# Patient Record
Sex: FEMALE | Race: WHITE | Employment: OTHER | URBAN - METROPOLITAN AREA
[De-identification: names, ages, dates, MRNs, and addresses within clinical notes are randomized per-mention and may not be internally consistent; named-entity substitution may affect disease eponyms.]

---

## 2018-01-20 PROBLEM — J96.01 ACUTE RESPIRATORY FAILURE WITH HYPOXEMIA (HCC): Status: ACTIVE | Noted: 2018-01-20

## 2018-01-20 PROBLEM — E87.6 HYPOKALEMIA: Status: ACTIVE | Noted: 2018-01-20

## 2018-01-21 PROBLEM — B33.8 RSV (RESPIRATORY SYNCYTIAL VIRUS INFECTION): Status: ACTIVE | Noted: 2018-01-21

## 2018-01-26 PROBLEM — J18.9 PNEUMONITIS: Status: ACTIVE | Noted: 2018-01-26

## 2018-01-26 PROBLEM — J98.4 PNEUMONITIS: Status: ACTIVE | Noted: 2018-01-26

## 2018-03-19 ENCOUNTER — HOSPITAL ENCOUNTER (OUTPATIENT)
Dept: MRI IMAGING | Age: 83
Discharge: HOME | End: 2018-03-21
Payer: MEDICARE

## 2018-03-19 DIAGNOSIS — G45.8 OTHER SPECIFIED TRANSIENT CEREBRAL ISCHEMIAS: ICD-10-CM

## 2018-03-19 DIAGNOSIS — I63.9 IMPENDING CEREBROVASCULAR ACCIDENT (HCC): ICD-10-CM

## 2018-03-19 PROCEDURE — 70551 MRI BRAIN STEM W/O DYE: CPT

## 2018-03-28 ENCOUNTER — HOSPITAL ENCOUNTER (OUTPATIENT)
Dept: INTERVENTIONAL RADIOLOGY/VASCULAR | Age: 83
Discharge: HOME | End: 2018-03-30
Payer: MEDICARE

## 2018-03-28 DIAGNOSIS — I69.998 VERTIGO, LATE EFFECT OF CEREBROVASCULAR DISEASE: ICD-10-CM

## 2018-03-28 DIAGNOSIS — I99.8 TRANSIENT ISCHEMIA: ICD-10-CM

## 2018-03-28 DIAGNOSIS — I65.29 STENOSIS OF CAROTID ARTERY, UNSPECIFIED LATERALITY: ICD-10-CM

## 2018-03-28 DIAGNOSIS — R42 VERTIGO, LATE EFFECT OF CEREBROVASCULAR DISEASE: ICD-10-CM

## 2018-03-28 PROCEDURE — 93880 EXTRACRANIAL BILAT STUDY: CPT

## 2018-05-27 ENCOUNTER — HOSPITAL ENCOUNTER (EMERGENCY)
Age: 83
Discharge: HOME | End: 2018-05-27
Payer: MEDICARE

## 2018-05-27 VITALS
TEMPERATURE: 99.1 F | RESPIRATION RATE: 16 BRPM | BODY MASS INDEX: 37.44 KG/M2 | HEART RATE: 69 BPM | WEIGHT: 208 LBS | OXYGEN SATURATION: 96 % | SYSTOLIC BLOOD PRESSURE: 170 MMHG | DIASTOLIC BLOOD PRESSURE: 79 MMHG

## 2018-05-27 DIAGNOSIS — K04.7 DENTAL ABSCESS: Primary | ICD-10-CM

## 2018-05-27 PROCEDURE — 99212 OFFICE O/P EST SF 10 MIN: CPT

## 2018-05-27 RX ORDER — AMOXICILLIN 500 MG/1
500 CAPSULE ORAL 3 TIMES DAILY
Qty: 30 CAPSULE | Refills: 0 | Status: SHIPPED | OUTPATIENT
Start: 2018-05-27 | End: 2018-06-06

## 2018-05-27 ASSESSMENT — PAIN DESCRIPTION - PAIN TYPE: TYPE: ACUTE PAIN

## 2018-05-27 ASSESSMENT — PAIN DESCRIPTION - FREQUENCY: FREQUENCY: CONTINUOUS

## 2018-05-27 ASSESSMENT — PAIN SCALES - GENERAL: PAINLEVEL_OUTOF10: 5

## 2018-05-27 ASSESSMENT — PAIN DESCRIPTION - DESCRIPTORS: DESCRIPTORS: ACHING

## 2018-05-27 ASSESSMENT — PAIN DESCRIPTION - ORIENTATION: ORIENTATION: RIGHT

## 2018-05-27 ASSESSMENT — PAIN DESCRIPTION - LOCATION: LOCATION: FACE

## 2018-07-23 ENCOUNTER — HOSPITAL ENCOUNTER (OUTPATIENT)
Dept: GENERAL RADIOLOGY | Age: 83
Discharge: HOME | End: 2018-07-25
Payer: MEDICARE

## 2018-07-23 ENCOUNTER — HOSPITAL ENCOUNTER (OUTPATIENT)
Age: 83
Discharge: HOME | End: 2018-07-25
Payer: MEDICARE

## 2018-07-23 DIAGNOSIS — G57.90 NEUROPATHY OF LOWER EXTREMITY, UNSPECIFIED LATERALITY: ICD-10-CM

## 2018-07-23 PROCEDURE — 73630 X-RAY EXAM OF FOOT: CPT

## 2018-08-23 ENCOUNTER — HOSPITAL ENCOUNTER (OUTPATIENT)
Age: 83
Discharge: HOME | End: 2018-08-23
Payer: MEDICARE

## 2018-08-23 LAB
ANION GAP SERPL CALCULATED.3IONS-SCNC: 12 MMOL/L (ref 7–16)
BUN BLDV-MCNC: 20 MG/DL (ref 8–23)
CALCIUM SERPL-MCNC: 9.2 MG/DL (ref 8.6–10.2)
CHLORIDE BLD-SCNC: 103 MMOL/L (ref 98–107)
CO2: 27 MMOL/L (ref 22–29)
CORTISOL TOTAL: 6.54 MCG/DL (ref 2.68–18.4)
CREAT SERPL-MCNC: 0.7 MG/DL (ref 0.5–1)
GFR AFRICAN AMERICAN: >60
GFR NON-AFRICAN AMERICAN: >60 ML/MIN/1.73
GLUCOSE BLD-MCNC: 98 MG/DL (ref 74–109)
POTASSIUM SERPL-SCNC: 4.1 MMOL/L (ref 3.5–5)
SODIUM BLD-SCNC: 142 MMOL/L (ref 132–146)

## 2018-08-23 PROCEDURE — 36415 COLL VENOUS BLD VENIPUNCTURE: CPT

## 2018-08-23 PROCEDURE — 82533 TOTAL CORTISOL: CPT

## 2018-08-23 PROCEDURE — 82088 ASSAY OF ALDOSTERONE: CPT

## 2018-08-23 PROCEDURE — 80048 BASIC METABOLIC PNL TOTAL CA: CPT

## 2018-08-23 PROCEDURE — 84244 ASSAY OF RENIN: CPT

## 2018-08-26 LAB
ALDOSTERONE: 19.6 NG/DL
RENIN ACTIVITY: 0.4 NG/ML/HR

## 2018-11-06 ENCOUNTER — APPOINTMENT (OUTPATIENT)
Dept: CT IMAGING | Age: 83
End: 2018-11-06
Payer: MEDICARE

## 2018-11-06 ENCOUNTER — APPOINTMENT (OUTPATIENT)
Dept: GENERAL RADIOLOGY | Age: 83
End: 2018-11-06
Payer: MEDICARE

## 2018-11-06 ENCOUNTER — HOSPITAL ENCOUNTER (EMERGENCY)
Age: 83
Discharge: HOME | End: 2018-11-06
Payer: MEDICARE

## 2018-11-06 VITALS
DIASTOLIC BLOOD PRESSURE: 86 MMHG | TEMPERATURE: 97.9 F | SYSTOLIC BLOOD PRESSURE: 188 MMHG | RESPIRATION RATE: 20 BRPM | BODY MASS INDEX: 37.8 KG/M2 | OXYGEN SATURATION: 95 % | HEART RATE: 63 BPM | WEIGHT: 210 LBS

## 2018-11-06 DIAGNOSIS — M54.50 ACUTE LOW BACK PAIN WITHOUT SCIATICA, UNSPECIFIED BACK PAIN LATERALITY: ICD-10-CM

## 2018-11-06 DIAGNOSIS — R10.84 GENERALIZED ABDOMINAL PAIN: Primary | ICD-10-CM

## 2018-11-06 LAB
ANION GAP SERPL CALCULATED.3IONS-SCNC: 10 MMOL/L (ref 7–16)
BASOPHILS ABSOLUTE: 0.04 E9/L (ref 0–0.2)
BASOPHILS RELATIVE PERCENT: 0.6 % (ref 0–2)
BILIRUBIN URINE: NEGATIVE
BLOOD, URINE: NEGATIVE
BUN BLDV-MCNC: 20 MG/DL (ref 8–23)
CALCIUM SERPL-MCNC: 9.9 MG/DL (ref 8.6–10.2)
CHLORIDE BLD-SCNC: 101 MMOL/L (ref 98–107)
CLARITY: CLEAR
CO2: 28 MMOL/L (ref 22–29)
COLOR: YELLOW
CREAT SERPL-MCNC: 0.8 MG/DL (ref 0.5–1)
EOSINOPHILS ABSOLUTE: 0.08 E9/L (ref 0.05–0.5)
EOSINOPHILS RELATIVE PERCENT: 1.1 % (ref 0–6)
GFR AFRICAN AMERICAN: >60
GFR NON-AFRICAN AMERICAN: >60 ML/MIN/1.73
GLUCOSE BLD-MCNC: 98 MG/DL (ref 74–99)
GLUCOSE URINE: NEGATIVE MG/DL
HCT VFR BLD CALC: 41.9 % (ref 34–48)
HEMOGLOBIN: 13.8 G/DL (ref 11.5–15.5)
IMMATURE GRANULOCYTES #: 0.05 E9/L
IMMATURE GRANULOCYTES %: 0.7 % (ref 0–5)
KETONES, URINE: NEGATIVE MG/DL
LEUKOCYTE ESTERASE, URINE: NEGATIVE
LYMPHOCYTES ABSOLUTE: 1.66 E9/L (ref 1.5–4)
LYMPHOCYTES RELATIVE PERCENT: 23.3 % (ref 20–42)
MCH RBC QN AUTO: 30.1 PG (ref 26–35)
MCHC RBC AUTO-ENTMCNC: 32.9 % (ref 32–34.5)
MCV RBC AUTO: 91.5 FL (ref 80–99.9)
MONOCYTES ABSOLUTE: 0.58 E9/L (ref 0.1–0.95)
MONOCYTES RELATIVE PERCENT: 8.2 % (ref 2–12)
NEUTROPHILS ABSOLUTE: 4.7 E9/L (ref 1.8–7.3)
NEUTROPHILS RELATIVE PERCENT: 66.1 % (ref 43–80)
NITRITE, URINE: NEGATIVE
PDW BLD-RTO: 13.3 FL (ref 11.5–15)
PH UA: 7 (ref 5–9)
PLATELET # BLD: 220 E9/L (ref 130–450)
PMV BLD AUTO: 9.8 FL (ref 7–12)
POTASSIUM SERPL-SCNC: 4.5 MMOL/L (ref 3.5–5)
PROTEIN UA: NEGATIVE MG/DL
RBC # BLD: 4.58 E12/L (ref 3.5–5.5)
SODIUM BLD-SCNC: 139 MMOL/L (ref 132–146)
SPECIFIC GRAVITY UA: 1.01 (ref 1–1.03)
UROBILINOGEN, URINE: 0.2 E.U./DL
WBC # BLD: 7.1 E9/L (ref 4.5–11.5)

## 2018-11-06 PROCEDURE — 80048 BASIC METABOLIC PNL TOTAL CA: CPT

## 2018-11-06 PROCEDURE — 99212 OFFICE O/P EST SF 10 MIN: CPT

## 2018-11-06 PROCEDURE — 85025 COMPLETE CBC W/AUTO DIFF WBC: CPT

## 2018-11-06 PROCEDURE — 81003 URINALYSIS AUTO W/O SCOPE: CPT

## 2018-11-06 PROCEDURE — 71046 X-RAY EXAM CHEST 2 VIEWS: CPT

## 2018-11-06 PROCEDURE — 36415 COLL VENOUS BLD VENIPUNCTURE: CPT

## 2018-11-06 PROCEDURE — 74176 CT ABD & PELVIS W/O CONTRAST: CPT

## 2018-11-06 PROCEDURE — 87088 URINE BACTERIA CULTURE: CPT

## 2018-11-06 ASSESSMENT — PAIN DESCRIPTION - ORIENTATION: ORIENTATION: UPPER

## 2018-11-06 ASSESSMENT — PAIN SCALES - GENERAL: PAINLEVEL_OUTOF10: 5

## 2018-11-06 ASSESSMENT — PAIN DESCRIPTION - LOCATION: LOCATION: ABDOMEN;BACK

## 2018-11-06 ASSESSMENT — PAIN DESCRIPTION - PAIN TYPE: TYPE: ACUTE PAIN

## 2018-11-06 NOTE — ED PROVIDER NOTES
Hemoglobin 13.8 11.5 - 15.5 g/dL    Hematocrit 41.9 34.0 - 48.0 %    MCV 91.5 80.0 - 99.9 fL    MCH 30.1 26.0 - 35.0 pg    MCHC 32.9 32.0 - 34.5 %    RDW 13.3 11.5 - 15.0 fL    Platelets 704 512 - 555 E9/L    MPV 9.8 7.0 - 12.0 fL    Neutrophils % 66.1 43.0 - 80.0 %    Immature Granulocytes % 0.7 0.0 - 5.0 %    Lymphocytes % 23.3 20.0 - 42.0 %    Monocytes % 8.2 2.0 - 12.0 %    Eosinophils % 1.1 0.0 - 6.0 %    Basophils % 0.6 0.0 - 2.0 %    Neutrophils # 4.70 1.80 - 7.30 E9/L    Immature Granulocytes # 0.05 E9/L    Lymphocytes # 1.66 1.50 - 4.00 E9/L    Monocytes # 0.58 0.10 - 0.95 E9/L    Eosinophils # 0.08 0.05 - 0.50 E9/L    Basophils # 0.04 0.00 - 0.20 G1/R   Basic Metabolic Panel   Result Value Ref Range    Sodium 139 132 - 146 mmol/L    Potassium 4.5 3.5 - 5.0 mmol/L    Chloride 101 98 - 107 mmol/L    CO2 28 22 - 29 mmol/L    Anion Gap 10 7 - 16 mmol/L    Glucose 98 74 - 99 mg/dL    BUN 20 8 - 23 mg/dL    CREATININE 0.8 0.5 - 1.0 mg/dL    GFR Non-African American >60 >=60 mL/min/1.73    GFR African American >60    Urinalysis   Result Value Ref Range    Color, UA Yellow Straw/Yellow    Clarity, UA Clear Clear    Glucose, Ur Negative Negative mg/dL    Bilirubin Urine Negative Negative    Ketones, Urine Negative Negative mg/dL    Specific Gravity, UA 1.010 1.005 - 1.030    Blood, Urine Negative Negative    pH, UA 7.0 5.0 - 9.0    Protein, UA Negative Negative mg/dL    Urobilinogen, Urine 0.2 <2.0 E.U./dL    Nitrite, Urine Negative Negative    Leukocyte Esterase, Urine Negative Negative       RADIOLOGY:  Interpreted by Radiologist.  CT ABDOMEN PELVIS WO CONTRAST   Final Result   1. There are no findings of obstructive uropathy. 2. No right or left renal calculus is noted. 3. Multiple bilateral renal cyst as described above. 4. There is no acute intra-abdominal or intrapelvic pathology. XR CHEST STANDARD (2 VW)   Final Result   1. No acute cardiopulmonary disease.

## 2018-11-08 LAB — URINE CULTURE, ROUTINE: NORMAL

## 2019-02-26 ENCOUNTER — HOSPITAL ENCOUNTER (OUTPATIENT)
Dept: GENERAL RADIOLOGY | Age: 84
Discharge: HOME | End: 2019-02-28
Payer: MEDICARE

## 2019-02-26 ENCOUNTER — HOSPITAL ENCOUNTER (OUTPATIENT)
Age: 84
Discharge: HOME | End: 2019-02-28
Payer: MEDICARE

## 2019-02-26 DIAGNOSIS — M54.5 LOW BACK PAIN, UNSPECIFIED BACK PAIN LATERALITY, UNSPECIFIED CHRONICITY, WITH SCIATICA PRESENCE UNSPECIFIED: ICD-10-CM

## 2019-02-26 PROCEDURE — 72100 X-RAY EXAM L-S SPINE 2/3 VWS: CPT

## 2019-03-15 ENCOUNTER — HOSPITAL ENCOUNTER (OUTPATIENT)
Dept: MRI IMAGING | Age: 84
Discharge: HOME | End: 2019-03-17
Payer: MEDICARE

## 2019-03-15 DIAGNOSIS — M54.16 LUMBAR RADICULOPATHY: ICD-10-CM

## 2019-03-15 PROCEDURE — 72148 MRI LUMBAR SPINE W/O DYE: CPT

## 2019-04-30 ENCOUNTER — OFFICE VISIT (OUTPATIENT)
Dept: PAIN MANAGEMENT | Age: 84
End: 2019-04-30
Payer: MEDICARE

## 2019-04-30 VITALS
HEIGHT: 63 IN | WEIGHT: 215 LBS | SYSTOLIC BLOOD PRESSURE: 134 MMHG | RESPIRATION RATE: 16 BRPM | OXYGEN SATURATION: 97 % | HEART RATE: 63 BPM | BODY MASS INDEX: 38.09 KG/M2 | TEMPERATURE: 98.7 F | DIASTOLIC BLOOD PRESSURE: 80 MMHG

## 2019-04-30 DIAGNOSIS — G89.4 CHRONIC PAIN SYNDROME: ICD-10-CM

## 2019-04-30 DIAGNOSIS — M47.816 LUMBAR SPONDYLOSIS: ICD-10-CM

## 2019-04-30 DIAGNOSIS — M47.816 LUMBAR FACET ARTHROPATHY: Primary | ICD-10-CM

## 2019-04-30 DIAGNOSIS — M48.061 SPINAL STENOSIS OF LUMBAR REGION WITHOUT NEUROGENIC CLAUDICATION: ICD-10-CM

## 2019-04-30 DIAGNOSIS — M54.16 LUMBAR RADICULOPATHY: ICD-10-CM

## 2019-04-30 DIAGNOSIS — M51.9 LUMBAR DISC DISORDER: ICD-10-CM

## 2019-04-30 PROCEDURE — 99204 OFFICE O/P NEW MOD 45 MIN: CPT | Performed by: PAIN MEDICINE

## 2019-04-30 PROCEDURE — G8598 ASA/ANTIPLAT THER USED: HCPCS | Performed by: PAIN MEDICINE

## 2019-04-30 PROCEDURE — 4040F PNEUMOC VAC/ADMIN/RCVD: CPT | Performed by: PAIN MEDICINE

## 2019-04-30 PROCEDURE — G8427 DOCREV CUR MEDS BY ELIG CLIN: HCPCS | Performed by: PAIN MEDICINE

## 2019-04-30 PROCEDURE — 1123F ACP DISCUSS/DSCN MKR DOCD: CPT | Performed by: PAIN MEDICINE

## 2019-04-30 PROCEDURE — 1090F PRES/ABSN URINE INCON ASSESS: CPT | Performed by: PAIN MEDICINE

## 2019-04-30 PROCEDURE — G8399 PT W/DXA RESULTS DOCUMENT: HCPCS | Performed by: PAIN MEDICINE

## 2019-04-30 PROCEDURE — 1036F TOBACCO NON-USER: CPT | Performed by: PAIN MEDICINE

## 2019-04-30 PROCEDURE — G8419 CALC BMI OUT NRM PARAM NOF/U: HCPCS | Performed by: PAIN MEDICINE

## 2019-04-30 NOTE — PROGRESS NOTES
Jonatan Lua presents to the [facility] on 4/30/2019. Luke Ferrell is complaining of pain in her lower back, right foot will go numb and left big toe. . The pain is intermittent. The pain is described as aching. Pain is rated on her best day at a 2, on her worst day at a 10, and on average at a 4 on the VAS scale. She took her last dose of Sandor freeze .         Any procedures since your last visit: No,       She has been on anticoagulation medications to include ASA and is managed by Wai Edge MD  .     Pacemaker or defibrilator: No Physician managing device is NA.       /80   Pulse 63   Temp 98.7 °F (37.1 °C) (Oral)   Resp 16   Ht 5' 2.5\" (1.588 m)   Wt 215 lb (97.5 kg)   SpO2 97%   BMI 38.70 kg/m²

## 2019-04-30 NOTE — PROGRESS NOTES
[address] [facility], [address], [place], operated by [facility]      [phone]          Consult Note      Patient:  Malaika Bolanos 1935    Date of Service:  4/30/19    Requesting Physician:  Jewell Cheung MD    Reason for Consult:      Patient presents with complaints of low back pain that started a long time ago and has been getting worse     HISTORY OF PRESENT ILLNESS:      Pain does radiate to both buttocks. She  has occasional numbness, tingling of the her feet and does not have bladder or bowel dysfunction. Imaging:   Lumbar spine MRI 03/2019  Multilevel spinal stenosis most severe L4-5 and L3-4    Previous treatments: Physical Therapy and medications. .      Past Medical History:   Diagnosis Date    Aldosterone-secreting adenoma     f/w Dr Alexandra Barrientos - Endocrinology    CAD (coronary artery disease)     f/w Dr Sanjana Escobar    Diverticulosis     GERD (gastroesophageal reflux disease)     Osteoarthritis     Sleep apnea      Past Surgical History:   Procedure Laterality Date    COLONOSCOPY      EYE SURGERY      HERNIA REPAIR      HYSTERECTOMY      KIDNEY SURGERY      UPPER GASTROINTESTINAL ENDOSCOPY       Prior to Admission medications    Medication Sig Start Date End Date Taking? Authorizing Provider   Multiple Vitamins-Minerals (PRESERVISION AREDS 2+MULTI VIT PO) Take by mouth   Yes Historical Provider, MD   spironolactone (ALDACTONE) 25 MG tablet Take 25 mg by mouth every evening Take 2 tablets in AM and 1 tablet in PM   Yes Historical Provider, MD   Nitroglycerin (NITRO-DUR TD) Place onto the skin   Yes Historical Provider, MD   aspirin 81 MG tablet Take 81 mg by mouth daily   Yes Historical Provider, MD   CPAP Machine MISC by Does not apply route   Yes Historical Provider, MD     Allergies   Allergen Reactions    Iodine Other (See Comments)     States body started shaking after taking something with iodine in it.     Sulfa Antibiotics Other (See Comments)     States that she passed out after taking sulfa.  Macrodantin [Nitrofurantoin Macrocrystal] Rash     Social History     Socioeconomic History    Marital status:      Spouse name: Not on file    Number of children: Not on file    Years of education: Not on file    Highest education level: Not on file   Occupational History    Not on file   Social Needs    Financial resource strain: Not on file    Food insecurity:     Worry: Not on file     Inability: Not on file    Transportation needs:     Medical: Not on file     Non-medical: Not on file   Tobacco Use    Smoking status: Never Smoker    Smokeless tobacco: Never Used   Substance and Sexual Activity    Alcohol use: No    Drug use: No    Sexual activity: Never   Lifestyle    Physical activity:     Days per week: Not on file     Minutes per session: Not on file    Stress: Not on file   Relationships    Social connections:     Talks on phone: Not on file     Gets together: Not on file     Attends Gnosticism service: Not on file     Active member of club or organization: Not on file     Attends meetings of clubs or organizations: Not on file     Relationship status: Not on file    Intimate partner violence:     Fear of current or ex partner: Not on file     Emotionally abused: Not on file     Physically abused: Not on file     Forced sexual activity: Not on file   Other Topics Concern    Not on file   Social History Narrative    Not on file       Family History   Problem Relation Age of Onset    Hypertension Father      REVIEW OF SYSTEMS:     Patient specifically denies fever/chills, chest pain, shortness of breath, new bowel or bladder complaints. All other review of systems was negative.     PHYSICAL EXAMINATION:      /80   Pulse 63   Temp 98.7 °F (37.1 °C) (Oral)   Resp 16   Ht 5' 2.5\" (1.588 m)   Wt 215 lb (97.5 kg)   SpO2 97%   BMI 38.70 kg/m²     General:      General appearance:   elderly, pleasant and well-hydrated. , in moderate discomfort and A & O x3  Build:Overweight    HEENT:    Head:normocephalic and atraumatic  Pupils:regular, round and equal.  Sclera: icterus absent,     Lungs:    Breathing:Breathing Pattern: regular, no distress    Abdomen:    Shape:non-distended and normal  Tenderness:none     Lumbar spine:    Spine inspection:normal   CVA tenderness:No   Palpation:tenderness paravertebral muscles, facet loading, left, right, positive and tenderness. Range of motion:abnormal moderately Lateral bending, flexion, extension rotation bilateral and is  painful. Musculoskeletal:    Trigger points in Paraveteral:absent bilaterally  SI joint tenderness:positive right, positive left              BHAVIK test:negative right, negative             left  Piriformis tenderness:negative right, negative left  Trochanteric bursa tenderness:negative right, negative left  SLR:negative right, negative left, sitting     Extremities:    Tremors:None bilaterally upper and lower  Range of motion: pain with internal rotation of hips negative but limited   Intact:Yes  Edema:3 mm non pitting edema    Knee:     Inspection:symmetric, swelling none bilaterally  Tenderness of Bony Landmarks:Lateral and Medial, bilateral  Drawer Test:negative  Effusion:absent bilaterally  Crepitus:present bilaterally  ROM:Left limited by pain  Right limited by pain     Neurological:    Sensory:diminished to light touch medial aspect of Right leg  Motor:              Right Bicep5/5           Left Bicep5/5              Right Triceps5/5       Left Triceps5/5          Right Deltoid5/5     Left Deltoid5/5                  Right Quadriceps4/5          Left Quadriceps4/5           Right Gastrocnemius5/5    Left Gastrocnemius5/5  Right Ant Tibialis5/5  Left Ant Tibialis5/5  Reflexes:    Right Brachioradialis reflex1+  Left Brachioradialis reflex1+  Right Biceps reflex1+  Left Biceps reflex1+  Right Triceps reflex1+  Left Triceps reflex1+  Right Quadriceps reflex0  Left Quadriceps reflex0  Right Achilles reflex2+  Left Achilles reflex2+  [address]    Dermatology:    Skin:no unusual rashes and no skin lesions    Impression:  Chronic low back pain with radiation to bilateral buttocks   Lumbar spine MRI mutlilevel spinal stenosis worst at L3-4 and L4-5   Plan:  Degenerative spinal stenosis   Discussed pathology of spinal stenosis with the patient and her family  Discussed treatment options with the patient including interventional procedures (LESI for her spinal stenosis, facet MBB for her facet arthrosis)  Currently her pain is managed with OTC pain medications   Will re-evaluate when her pain worsens   Hold off on urine screen   OARRS report reviewed  Patient encouraged to stay active and to lose weight  Treatment plan discussed with the patient including medications and procedure side effects     We discussed with the patient that combining opioids, benzodiazepines, alcohol, illicit drugs or sleep aids increases the risk of respiratory depression including death. We discussed that these medications may cause drowsiness, sedation or dizziness and have counseled the patient not to drive or operate machinery. We have discussed that these medications will be prescribed only by one provider. We have discussed with the patient about age related risk factors and have thoroughly discussed the importance of taking these medications as prescribed. The patient verbalizes understanding. [email or web address] Whalen M.D.

## 2019-09-04 ENCOUNTER — TELEPHONE (OUTPATIENT)
Dept: PAIN MANAGEMENT | Age: 84
End: 2019-09-04

## 2019-09-04 ENCOUNTER — OFFICE VISIT (OUTPATIENT)
Dept: PAIN MANAGEMENT | Age: 84
End: 2019-09-04
Payer: MEDICARE

## 2019-09-04 VITALS
SYSTOLIC BLOOD PRESSURE: 140 MMHG | HEIGHT: 62 IN | OXYGEN SATURATION: 96 % | HEART RATE: 72 BPM | TEMPERATURE: 98.2 F | DIASTOLIC BLOOD PRESSURE: 58 MMHG | WEIGHT: 220 LBS | RESPIRATION RATE: 18 BRPM | BODY MASS INDEX: 40.48 KG/M2

## 2019-09-04 DIAGNOSIS — S22.000A CLOSED COMPRESSION FRACTURE OF THORACIC VERTEBRA, INITIAL ENCOUNTER (HCC): ICD-10-CM

## 2019-09-04 DIAGNOSIS — G89.4 CHRONIC PAIN SYNDROME: Primary | ICD-10-CM

## 2019-09-04 DIAGNOSIS — M54.16 LUMBAR RADICULOPATHY: ICD-10-CM

## 2019-09-04 DIAGNOSIS — M54.14 THORACIC RADICULOPATHY: ICD-10-CM

## 2019-09-04 DIAGNOSIS — M51.9 LUMBAR DISC DISORDER: ICD-10-CM

## 2019-09-04 DIAGNOSIS — M48.061 SPINAL STENOSIS OF LUMBAR REGION WITHOUT NEUROGENIC CLAUDICATION: ICD-10-CM

## 2019-09-04 DIAGNOSIS — M47.816 LUMBAR SPONDYLOSIS: ICD-10-CM

## 2019-09-04 DIAGNOSIS — M47.816 LUMBAR FACET ARTHROPATHY: ICD-10-CM

## 2019-09-04 PROCEDURE — 1090F PRES/ABSN URINE INCON ASSESS: CPT | Performed by: PAIN MEDICINE

## 2019-09-04 PROCEDURE — G8417 CALC BMI ABV UP PARAM F/U: HCPCS | Performed by: PAIN MEDICINE

## 2019-09-04 PROCEDURE — 4040F PNEUMOC VAC/ADMIN/RCVD: CPT | Performed by: PAIN MEDICINE

## 2019-09-04 PROCEDURE — 99214 OFFICE O/P EST MOD 30 MIN: CPT | Performed by: PAIN MEDICINE

## 2019-09-04 PROCEDURE — 1123F ACP DISCUSS/DSCN MKR DOCD: CPT | Performed by: PAIN MEDICINE

## 2019-09-04 PROCEDURE — 1036F TOBACCO NON-USER: CPT | Performed by: PAIN MEDICINE

## 2019-09-04 PROCEDURE — 99213 OFFICE O/P EST LOW 20 MIN: CPT | Performed by: PAIN MEDICINE

## 2019-09-04 PROCEDURE — G8399 PT W/DXA RESULTS DOCUMENT: HCPCS | Performed by: PAIN MEDICINE

## 2019-09-04 PROCEDURE — G8598 ASA/ANTIPLAT THER USED: HCPCS | Performed by: PAIN MEDICINE

## 2019-09-04 PROCEDURE — G8427 DOCREV CUR MEDS BY ELIG CLIN: HCPCS | Performed by: PAIN MEDICINE

## 2019-09-04 NOTE — TELEPHONE ENCOUNTER
Ivon called in, she is having increased pain and Dr. Anell Leyden instructed her to call and he would set her up for an epidural injection. Patient scheduled to see Dr. Anell Leyden. I called Ivon back, her pain is on the right side lower back and all the way up. Having trouble getting out of bed.

## 2019-09-04 NOTE — PROGRESS NOTES
Tibialis5/5  Left Ant Tibialis5/5  Reflexes:    Right Brachioradialis reflex1+  Left Brachioradialis reflex1+  Right Biceps reflex1+  Left Biceps reflex1+  Right Triceps reflex1+  Left Triceps reflex1+  Right Quadriceps reflex0  Left Quadriceps reflex0  Right Achilles reflex2+  Left Achilles reflex2+  Gait:antalgic     Dermatology:     Skin:no unusual rashes and no skin lesions     Impression:  Chronic low back pain with radiation to bilateral buttocks   Lumbar spine MRI mutlilevel spinal stenosis worst at L3-4 and L4-5   Plan:  Patient is here to re-establish care with the practice with complaints of increased mid back pain with radiation to the Right anterior chest ?? T9-10 radiculopathy  Will schedule patient for thoracic spine Xray  OARRS report reviewed  Patient encouraged to stay active and to lose weight  Treatment plan discussed with the patient    We discussed with the patient that combining opioids, benzodiazepines, alcohol, illicit drugs or sleep aids increases the risk of respiratory depression including death. We discussed that these medications may cause drowsiness, sedation or dizziness and have counseled the patient not to drive or operate machinery. We have discussed that these medications will be prescribed only by one provider. We have discussed with the patient about age related risk factors and have thoroughly discussed the importance of taking these medications as prescribed. The patient verbalizes understanding. ccrefhanh Meneses M.D.

## 2019-09-12 ENCOUNTER — HOSPITAL ENCOUNTER (OUTPATIENT)
Age: 84
Discharge: HOME | End: 2019-09-14
Payer: MEDICARE

## 2019-09-12 ENCOUNTER — HOSPITAL ENCOUNTER (OUTPATIENT)
Dept: GENERAL RADIOLOGY | Age: 84
Discharge: HOME | End: 2019-09-14
Payer: MEDICARE

## 2019-09-12 DIAGNOSIS — S22.000A CLOSED COMPRESSION FRACTURE OF THORACIC VERTEBRA, INITIAL ENCOUNTER (HCC): ICD-10-CM

## 2019-09-12 DIAGNOSIS — M54.14 THORACIC RADICULOPATHY: ICD-10-CM

## 2019-09-12 PROCEDURE — 72072 X-RAY EXAM THORAC SPINE 3VWS: CPT

## 2019-09-18 ENCOUNTER — OFFICE VISIT (OUTPATIENT)
Dept: PAIN MANAGEMENT | Age: 84
End: 2019-09-18
Payer: MEDICARE

## 2019-09-18 ENCOUNTER — PREP FOR PROCEDURE (OUTPATIENT)
Dept: PAIN MANAGEMENT | Age: 84
End: 2019-09-18

## 2019-09-18 VITALS
OXYGEN SATURATION: 96 % | DIASTOLIC BLOOD PRESSURE: 78 MMHG | HEART RATE: 59 BPM | SYSTOLIC BLOOD PRESSURE: 178 MMHG | TEMPERATURE: 98.6 F | RESPIRATION RATE: 16 BRPM | HEIGHT: 62 IN | BODY MASS INDEX: 39.56 KG/M2 | WEIGHT: 215 LBS

## 2019-09-18 DIAGNOSIS — G89.4 CHRONIC PAIN SYNDROME: ICD-10-CM

## 2019-09-18 DIAGNOSIS — M51.9 LUMBAR DISC DISORDER: ICD-10-CM

## 2019-09-18 DIAGNOSIS — M47.816 LUMBAR FACET ARTHROPATHY: ICD-10-CM

## 2019-09-18 DIAGNOSIS — M47.816 LUMBAR SPONDYLOSIS: ICD-10-CM

## 2019-09-18 DIAGNOSIS — M54.16 LUMBAR RADICULOPATHY: Primary | ICD-10-CM

## 2019-09-18 DIAGNOSIS — S22.000A CLOSED COMPRESSION FRACTURE OF THORACIC VERTEBRA, INITIAL ENCOUNTER (HCC): ICD-10-CM

## 2019-09-18 DIAGNOSIS — M48.061 SPINAL STENOSIS OF LUMBAR REGION WITHOUT NEUROGENIC CLAUDICATION: ICD-10-CM

## 2019-09-18 DIAGNOSIS — M54.14 THORACIC RADICULOPATHY: ICD-10-CM

## 2019-09-18 PROCEDURE — 4040F PNEUMOC VAC/ADMIN/RCVD: CPT | Performed by: PAIN MEDICINE

## 2019-09-18 PROCEDURE — G8598 ASA/ANTIPLAT THER USED: HCPCS | Performed by: PAIN MEDICINE

## 2019-09-18 PROCEDURE — G8417 CALC BMI ABV UP PARAM F/U: HCPCS | Performed by: PAIN MEDICINE

## 2019-09-18 PROCEDURE — 1090F PRES/ABSN URINE INCON ASSESS: CPT | Performed by: PAIN MEDICINE

## 2019-09-18 PROCEDURE — 1123F ACP DISCUSS/DSCN MKR DOCD: CPT | Performed by: PAIN MEDICINE

## 2019-09-18 PROCEDURE — 99214 OFFICE O/P EST MOD 30 MIN: CPT | Performed by: PAIN MEDICINE

## 2019-09-18 PROCEDURE — 1036F TOBACCO NON-USER: CPT | Performed by: PAIN MEDICINE

## 2019-09-18 PROCEDURE — G8427 DOCREV CUR MEDS BY ELIG CLIN: HCPCS | Performed by: PAIN MEDICINE

## 2019-09-18 PROCEDURE — 99213 OFFICE O/P EST LOW 20 MIN: CPT | Performed by: PAIN MEDICINE

## 2019-09-18 PROCEDURE — G8399 PT W/DXA RESULTS DOCUMENT: HCPCS | Performed by: PAIN MEDICINE

## 2019-09-18 NOTE — PROGRESS NOTES
bowel or bladder complaints. All other review of systems was negative. PHYSICAL EXAMINATION:      BP (!) 178/78   Pulse 59   Temp 98.6 °F (37 °C) (Oral)   Resp 16   Ht 5' 2\" (1.575 m)   Wt 215 lb (97.5 kg)   SpO2 96%   BMI 39.32 kg/m²     General:       General appearance:   elderly, pleasant and well-hydrated. , in moderate discomfort and A & O x3  Build:Overweight     HEENT:     Head:normocephalic and atraumatic  Pupils:regular, round and equal.  Sclera: icterus absent,      Lungs:     Breathing:Breathing Pattern: regular, no distress     Abdomen:     Shape:non-distended and normal  Tenderness:none     Thoracic spine:    Spine inspection:normal   pationPal:tenderness paravertebral muscles, midline tenderness, facet loading, left, right, positive and tenderness. Range of motion:abnormal moderately flexion, extension rotation right and is  painful.     Lumbar spine:     Spine inspection:normal   CVA tenderness:No   Palpation:tenderness paravertebral muscles, facet loading, left, right, positive and tenderness.   Range of motion:abnormal moderately Lateral bending, flexion, extension rotation bilateral and is  painful.     Musculoskeletal:     Trigger points in Paraveteral:absent bilaterally  SI joint tenderness:positive right, positive left              BHAVIK test:negative right, negative left  Piriformis tenderness:negative right, negative left  Trochanteric bursa tenderness:negative right, negative left  SLR:negative right, negative left, sitting      Extremities:     Tremors:None bilaterally upper and lower  Range of motion: pain with internal rotation of hips negative but limited   Intact:Yes  Edema:3 mm non pitting edema      Neurological:     Sensory:diminished to light touch medial aspect of Right leg  Motor:              Right Bicep5/5           Left Bicep5/5              Right Triceps5/5       Left Triceps5/5          Right Deltoid5/5     Left Deltoid5/5                  Right Quadriceps4/5 Left Quadriceps4/5           Right Gastrocnemius5/5    Left Gastrocnemius5/5  Right Ant Tibialis5/5  Left Ant Tibialis5/5  Reflexes:    Right Brachioradialis reflex1+  Left Brachioradialis reflex1+  Right Biceps reflex1+  Left Biceps reflex1+  Right Triceps reflex1+  Left Triceps reflex1+  Right Quadriceps reflex0  Left Quadriceps reflex0  Right Achilles reflex2+  Left Achilles reflex2+  Gait:antalgic     Dermatology:     Skin:no unusual rashes and no skin lesions     Impression:  Chronic low back pain with radiation to bilateral buttocks   Lumbar spine MRI mutlilevel spinal stenosis worst at L3-4 and L4-5   Plan:  Follow up on her mid back pain   Results of thoracic spine Xray were discussed with the patient   Will schedule patient for Thoracic spine BARRY T11-12  OARRS report reviewed  Patient encouraged to stay active and to lose weight  Treatment plan discussed with the patient    We discussed with the patient that combining opioids, benzodiazepines, alcohol, illicit drugs or sleep aids increases the risk of respiratory depression including death. We discussed that these medications may cause drowsiness, sedation or dizziness and have counseled the patient not to drive or operate machinery. We have discussed that these medications will be prescribed only by one provider. We have discussed with the patient about age related risk factors and have thoroughly discussed the importance of taking these medications as prescribed. The patient verbalizes understanding. tonie Calvillo M.D.

## 2019-10-01 ENCOUNTER — TELEPHONE (OUTPATIENT)
Dept: PAIN MANAGEMENT | Age: 84
End: 2019-10-01

## 2019-10-15 ENCOUNTER — TELEPHONE (OUTPATIENT)
Dept: PAIN MANAGEMENT | Age: 84
End: 2019-10-15

## 2020-05-26 ENCOUNTER — HOSPITAL ENCOUNTER (EMERGENCY)
Age: 85
Discharge: HOME | End: 2020-05-26
Payer: MEDICARE

## 2020-05-26 VITALS
TEMPERATURE: 98.8 F | HEIGHT: 62 IN | DIASTOLIC BLOOD PRESSURE: 82 MMHG | WEIGHT: 232 LBS | SYSTOLIC BLOOD PRESSURE: 208 MMHG | BODY MASS INDEX: 42.69 KG/M2 | HEART RATE: 73 BPM | OXYGEN SATURATION: 97 % | RESPIRATION RATE: 20 BRPM

## 2020-05-26 PROCEDURE — 99282 EMERGENCY DEPT VISIT SF MDM: CPT

## 2020-05-26 RX ORDER — DOXYCYCLINE HYCLATE 100 MG
100 TABLET ORAL 2 TIMES DAILY
Qty: 20 TABLET | Refills: 0 | Status: SHIPPED | OUTPATIENT
Start: 2020-05-26 | End: 2020-06-05

## 2020-05-26 ASSESSMENT — PAIN DESCRIPTION - ORIENTATION: ORIENTATION: LEFT

## 2020-05-26 ASSESSMENT — PAIN DESCRIPTION - PAIN TYPE: TYPE: ACUTE PAIN

## 2020-05-26 ASSESSMENT — PAIN DESCRIPTION - LOCATION: LOCATION: BREAST

## 2020-05-26 NOTE — ED PROVIDER NOTES
provider has spoken with the patient and discussed todays results, in addition to providing specific details for the plan of care and counseling regarding the diagnosis and prognosis. Questions are answered at this time and they are agreeable with the plan.      --------------------------------- IMPRESSION AND DISPOSITION ---------------------------------    IMPRESSION  1. Cellulitis of right breast    2. Tick bite, initial encounter        DISPOSITION  Disposition: Discharge to home  Patient condition is good      NOTE: This report was transcribed using voice recognition software.  Every effort was made to ensure accuracy; however, inadvertent computerized transcription errors may be present     [place], [place]  05/26/20 2027

## 2020-11-03 PROBLEM — M47.816 LUMBAR SPONDYLOSIS: Status: RESOLVED | Noted: 2019-04-30 | Resolved: 2020-11-03

## 2021-01-22 ENCOUNTER — HOSPITAL ENCOUNTER (EMERGENCY)
Age: 86
Discharge: HOME | End: 2021-01-22
Payer: MEDICARE

## 2021-01-22 VITALS
RESPIRATION RATE: 20 BRPM | HEART RATE: 72 BPM | SYSTOLIC BLOOD PRESSURE: 217 MMHG | DIASTOLIC BLOOD PRESSURE: 93 MMHG | WEIGHT: 219 LBS | HEIGHT: 63 IN | TEMPERATURE: 98.7 F | OXYGEN SATURATION: 95 % | BODY MASS INDEX: 38.8 KG/M2

## 2021-01-22 DIAGNOSIS — R09.81 NASAL SINUS CONGESTION: Primary | ICD-10-CM

## 2021-01-22 PROCEDURE — 99212 OFFICE O/P EST SF 10 MIN: CPT

## 2021-01-22 RX ORDER — MOMETASONE FUROATE 50 UG/1
1 SPRAY, METERED NASAL EVERY 12 HOURS
Qty: 1 INHALER | Refills: 0 | Status: SHIPPED | OUTPATIENT
Start: 2021-01-22 | End: 2021-01-29

## 2021-01-22 NOTE — ED NOTES
Pt instructed  On high bp   Recheck 170/88    Instructed to go to dr Abhilash Mike, LPN  60/81/94 [id number]

## 2021-01-22 NOTE — ED PROVIDER NOTES
[address] [facility]  Department of Emergency Medicine   ED  Encounter Note  Admit Date/RoomTime: 2021 11:41 AM  ED Room:   NAME: Josette Koyanagi  : 1935  MRN: [id number]     Chief Complaint:  Other (started  last week trouble breathing  left side of nares blocked   [place])    HISTORY OF PRESENT ILLNESS        Josette Koyanagi is a 80 y.o. female who presents to the ED complaining that she cannot breathe through the left side of her nose. Patient states that the left side of her nose has been blocked now for several days. She has tried blowing it without relief. She is also tried picking at it without relief. Does admit she caused a nosebleed the other night. Other than that patient has no complaints. She denies a headache. She denies a sore throat. She denies shortness of breath. Denies loss of smell. Denies loss of taste, though a sandwich she ate the other day did not taste as good as she thought it would be. She denies any nausea or vomiting. Patient's blood pressure is elevated. She has not taken her medications yet today. ROS   Pertinent positives and negatives are stated within HPI, all other systems reviewed and are negative. Past Medical History:  has a past medical history of Aldosterone-secreting adenoma, CAD (coronary artery disease), Diverticulosis, GERD (gastroesophageal reflux disease), Osteoarthritis, RSV (respiratory syncytial virus infection), and Sleep apnea. Surgical History:  has a past surgical history that includes Hysterectomy; Kidney surgery; hernia repair; Upper gastrointestinal endoscopy; Colonoscopy; and Eye surgery. Social History:  reports that she has never smoked. She has never used smokeless tobacco. She reports that she does not drink alcohol or use drugs. Family History: family history includes Hypertension in her father.      Allergies: Iodine, Sulfa antibiotics, and Macrodantin [nitrofurantoin macrocrystal]    PHYSICAL EXAM   Oxygen Saturation Interpretation: Normal.        ED Triage Vitals [01/22/21 1144]   BP Temp Temp Source Pulse Resp SpO2 Height Weight   (!) 217/93 98.7 °F (37.1 °C) Infrared 72 20 95 % 5' 3\" (1.6 m) 219 lb (99.3 kg)       General:  NAD. Alert and Oriented. Well-appearing. Skin:  Warm, dry. No rashes. Head:  Normocephalic. Atraumatic. Eyes:  EOMI. Conjunctiva normal.  ENT:  Oral mucosa moist.  Airway patent. Posterior pharynx without erythema, without swelling, without exudate. Bilateral hearing aids (though I am talking really loud and they are not working so well.)  Nasal turbinates are significantly swollen bilateral.  Left side has near occlusion of the nares. Clear drainage. I do not appreciate any polyps or foreign body. Neck:  Supple. Normal ROM. Respiratory:  No respiratory distress. No labored breathing. Lungs clear without rales, rhonchi or wheezing. Cardiovascular:  Regular rate. No peripheral edema. Extremities warm and good color. Extremities:  Normal ROM. Nontender to palpation. Atraumatic. Back:  Normal ROM. Nontender to palpation. Neuro:  Alert and Oriented to person, place, time and situation. Normal LOC. Moves all extremities. Speech fluent. Psych:  Calm and Cooperative. Normal thought process. Normal judgement. Lab / Imaging Results   (All laboratory and radiology results have been personally reviewed by myself)  Labs:  No results found for this visit on 01/22/21. Imaging: All Radiology results interpreted by Radiologist unless otherwise noted. No orders to display       ED Course / Medical Decision Making   Medications - No data to display     Re-examination:  1/22/21       Time:   Patients condition . Consult(s):   None    Procedure(s):   none    MDM:   As discussed with patient she should not be taking any Mucinex or pseudoephedrine over-the-counter since she has elevated blood pressure.   I will write for a nasal spray to help open up her nasal passages. She asked me about the [employer] and nasal cleaning kits. I did tell her that she probably would benefit from 1 of those, but should get help from family on using it. Plan of Care/Counseling:  I reviewed today's visit with the patient in addition to providing specific details for the plan of care and counseling regarding the diagnosis and prognosis. Questions are answered at this time and are agreeable with the plan. ASSESSMENT     1. Nasal sinus congestion New Problem     PLAN   Discharged home. Patient condition is good    New Medications     New Prescriptions    MOMETASONE (NASONEX) 50 MCG/ACT NASAL SPRAY    1 spray by Each Nostril route every 12 hours for 7 days     Electronically signed by SANTOS Madrigal   DD: 1/22/21  **This report was transcribed using voice recognition software. Every effort was made to ensure accuracy; however, inadvertent computerized transcription errors may be present.   END OF ED PROVIDER NOTE       David Madrigal  01/22/21 [id number]

## 2021-07-21 ENCOUNTER — HOSPITAL ENCOUNTER (OUTPATIENT)
Age: 86
Discharge: HOME | End: 2021-07-21
Payer: MEDICARE

## 2021-07-21 LAB
ANION GAP SERPL CALCULATED.3IONS-SCNC: 10 MMOL/L (ref 7–16)
BUN BLDV-MCNC: 22 MG/DL (ref 6–23)
CALCIUM SERPL-MCNC: 9.7 MG/DL (ref 8.6–10.2)
CHLORIDE BLD-SCNC: 104 MMOL/L (ref 98–107)
CO2: 27 MMOL/L (ref 22–29)
CORTISOL TOTAL: 6.76 MCG/DL (ref 2.68–18.4)
CREAT SERPL-MCNC: 1.1 MG/DL (ref 0.5–1)
GFR AFRICAN AMERICAN: 57
GFR NON-AFRICAN AMERICAN: 47 ML/MIN/1.73
GLUCOSE BLD-MCNC: 97 MG/DL (ref 74–99)
POTASSIUM SERPL-SCNC: 4.8 MMOL/L (ref 3.5–5)
SODIUM BLD-SCNC: 141 MMOL/L (ref 132–146)

## 2021-07-21 PROCEDURE — 80048 BASIC METABOLIC PNL TOTAL CA: CPT

## 2021-07-21 PROCEDURE — 82533 TOTAL CORTISOL: CPT

## 2021-07-21 PROCEDURE — 84244 ASSAY OF RENIN: CPT

## 2021-07-21 PROCEDURE — 36415 COLL VENOUS BLD VENIPUNCTURE: CPT

## 2021-07-21 PROCEDURE — 82088 ASSAY OF ALDOSTERONE: CPT

## 2021-07-24 LAB — ALDOSTERONE: 36 NG/DL

## 2021-08-01 LAB — RENIN ACTIVITY: 1.3 NG/ML/HR

## 2021-09-25 ENCOUNTER — HOSPITAL ENCOUNTER (EMERGENCY)
Age: 86
Discharge: HOME | End: 2021-09-25
Payer: MEDICARE

## 2021-09-25 VITALS
OXYGEN SATURATION: 96 % | SYSTOLIC BLOOD PRESSURE: 168 MMHG | RESPIRATION RATE: 16 BRPM | TEMPERATURE: 98.2 F | BODY MASS INDEX: 38.09 KG/M2 | HEART RATE: 65 BPM | DIASTOLIC BLOOD PRESSURE: 81 MMHG | WEIGHT: 215 LBS

## 2021-09-25 DIAGNOSIS — R03.0 ELEVATED BLOOD PRESSURE READING: Primary | ICD-10-CM

## 2021-09-25 PROCEDURE — 99211 OFF/OP EST MAY X REQ PHY/QHP: CPT

## 2021-09-25 NOTE — ED PROVIDER NOTES
HPI:  9/25/21, Time: 4:33 PM EDT         López Mariano is a 80 y.o. female presenting to the ED for evaluation. She reports that she was at another doctor's office today and her blood pressure was elevated in his office she was advised to go to the emergency department she said it was 233 over she does not know what. She says she has no symptoms at all ---she is denying chest pain shortness of breath headache dizziness blurred vision or any other complaints she said she feels fine. Review of Systems:   A complete review of systems was performed and pertinent positives and negatives are stated within HPI, all other systems reviewed and are negative.          --------------------------------------------- PAST HISTORY ---------------------------------------------  Past Medical History:  has a past medical history of Aldosterone-secreting adenoma, CAD (coronary artery disease), Diverticulosis, GERD (gastroesophageal reflux disease), Osteoarthritis, RSV (respiratory syncytial virus infection), and Sleep apnea. Past Surgical History:  has a past surgical history that includes Hysterectomy; Kidney surgery; hernia repair; Upper gastrointestinal endoscopy; Colonoscopy; and Eye surgery. Social History:  reports that she has never smoked. She has never used smokeless tobacco. She reports that she does not drink alcohol and does not use drugs. Family History: family history includes Hypertension in her father. The patients home medications have been reviewed. Allergies: Iodine, Sulfa antibiotics, and Macrodantin [nitrofurantoin macrocrystal]    -------------------------------------------------- RESULTS -------------------------------------------------  All laboratory and radiology results have been personally reviewed by myself   LABS:  No results found for this visit on 09/25/21.     RADIOLOGY:  Interpreted by Radiologist.  No orders to display       ------------------------- NURSING NOTES AND VITALS REVIEWED ---------------------------   The nursing notes within the ED encounter and vital signs as below have been reviewed. BP (!) 168/81   Pulse 65   Temp 98.2 °F (36.8 °C)   Resp 16   Wt 215 lb (97.5 kg)   SpO2 96%   BMI 38.09 kg/m²   Oxygen Saturation Interpretation: Normal      ---------------------------------------------------PHYSICAL EXAM--------------------------------------      Constitutional/General: Alert and oriented x3, well appearing, non toxic in NAD  Head: Normocephalic and atraumatic  Eyes: clear  Mouth: Oropharynx clear, handling secretions, no trismus  Neck: Supple, full ROM,   Pulmonary: Lungs clear to auscultation bilaterally, no wheezes, rales, or rhonchi. Not in respiratory distress  Cardiovascular:  Regular rate and rhythm, no murmurs, gallops, or rubs. 2+ distal pulses  Abdomen: Soft, non tender, non distended,   Extremities: Moves all extremities x 4. Skin: warm and dry without rash  Neurologic: GCS 15,  Psych: Normal Affect      ------------------------------ ED COURSE/MEDICAL DECISION MAKING----------------------  Medications - No data to display      ED COURSE:       Medical Decision Making:    Patient feels well she has no complaints at all her blood pressure check  here initially was 173/82. I did repeat it when I was in the room and it was 168/81. Her lungs are clear her heart rate is regular she is denying any complaints. She said that she does have a monitor to check her blood pressure at home I advised her to check it once or twice a day to see where she is at and to make an appointment with her doctor soon as possible to have this rechecked. If she has any further problems with that she needs to go to the emergency department.         --------------------------------- IMPRESSION AND DISPOSITION ---------------------------------    IMPRESSION  1.  Elevated blood pressure reading        DISPOSITION  Disposition: Discharge to home  Patient condition is good      NOTE: This report was transcribed using voice recognition software.  Every effort was made to ensure accuracy; however, inadvertent computerized transcription errors may be present     RUTHANN Jhaveri - CNP  09/25/21 9207

## 2022-07-18 ENCOUNTER — HOSPITAL ENCOUNTER (EMERGENCY)
Age: 87
Discharge: HOME | End: 2022-07-18
Payer: MEDICARE

## 2022-07-18 VITALS
WEIGHT: 203 LBS | TEMPERATURE: 97.1 F | HEART RATE: 66 BPM | SYSTOLIC BLOOD PRESSURE: 190 MMHG | BODY MASS INDEX: 39.85 KG/M2 | HEIGHT: 60 IN | OXYGEN SATURATION: 95 % | DIASTOLIC BLOOD PRESSURE: 67 MMHG | RESPIRATION RATE: 20 BRPM

## 2022-07-18 DIAGNOSIS — L03.115 CELLULITIS OF RIGHT LOWER EXTREMITY: Primary | ICD-10-CM

## 2022-07-18 PROCEDURE — 99211 OFF/OP EST MAY X REQ PHY/QHP: CPT

## 2022-07-18 RX ORDER — CEPHALEXIN 500 MG/1
500 CAPSULE ORAL 4 TIMES DAILY
Qty: 28 CAPSULE | Refills: 0 | Status: SHIPPED | OUTPATIENT
Start: 2022-07-18 | End: 2022-07-25

## 2022-07-18 RX ORDER — DOXYCYCLINE HYCLATE 100 MG
100 TABLET ORAL 2 TIMES DAILY
Qty: 20 TABLET | Refills: 0 | Status: SHIPPED | OUTPATIENT
Start: 2022-07-18 | End: 2022-07-28

## 2022-07-18 ASSESSMENT — PAIN - FUNCTIONAL ASSESSMENT: PAIN_FUNCTIONAL_ASSESSMENT: NONE - DENIES PAIN

## 2022-07-18 NOTE — ED PROVIDER NOTES
HPI:  7/18/22, Time: 1:59 PM EDT         Ghislaine Flower is a 80 y.o. female presenting to the ED for wound to the right lower leg, beginning 3 days ago. The complaint has been persistent, moderate in severity, and worsened by palpation of the area. Patient and daughter provide history at urgent care today. States that she scratched the lower right leg on a nail 3 days ago. Since that time she is noted some increased swelling and erythema to the area. Small amount of drainage today which is what prompted evaluation. Afebrile without recent travel or sick contacts. Patient and daughter deny all other symptoms at this time. Review of Systems:   A complete review of systems was performed and pertinent positives and negatives are stated within HPI, all other systems reviewed and are negative.          --------------------------------------------- PAST HISTORY ---------------------------------------------  Past Medical History:  has a past medical history of Aldosterone-secreting adenoma, CAD (coronary artery disease), Diverticulosis, GERD (gastroesophageal reflux disease), Osteoarthritis, RSV (respiratory syncytial virus infection), and Sleep apnea. Past Surgical History:  has a past surgical history that includes Hysterectomy; Kidney surgery; hernia repair; Upper gastrointestinal endoscopy; Colonoscopy; and Eye surgery. Social History:  reports that she has never smoked. She has never used smokeless tobacco. She reports that she does not drink alcohol and does not use drugs. Family History: family history includes Hypertension in her father. The patients home medications have been reviewed.     Allergies: Iodine, Sulfa antibiotics, and Macrodantin [nitrofurantoin macrocrystal]    -------------------------------------------------- RESULTS -------------------------------------------------  All laboratory and radiology results have been personally reviewed by myself   LABS:  No results found for this visit on 07/18/22. RADIOLOGY:  Interpreted by Radiologist.  No orders to display       ------------------------- NURSING NOTES AND VITALS REVIEWED ---------------------------   The nursing notes within the ED encounter and vital signs as below have been reviewed. BP (!) 190/67   Pulse 66   Temp 97.1 °F (36.2 °C) (Infrared)   Resp 20   Ht 5' (1.524 m)   Wt 203 lb (92.1 kg)   SpO2 95%   BMI 39.65 kg/m²   Oxygen Saturation Interpretation: Normal      ---------------------------------------------------PHYSICAL EXAM--------------------------------------      Constitutional/General: Alert and oriented x3, well appearing, non toxic in NAD  Head: Normocephalic and atraumatic  Eyes: PERRL, EOMI  Mouth: Oropharynx clear, handling secretions, no trismus  Neck: Supple, full ROM,   Extremities: Moves all extremities x 4. Warm and well perfused. Area of swelling, and erythema noted to the anterior lateral aspect of the right lower leg just proximal to the ankle. There is a small area of opening with serosanguineous drainage without any fluctuance noted. Compartments of the right lower leg soft. 2+ dorsalis pedis pulse on the right. Skin: warm and dry without rash  Neurologic: GCS 15,  Psych: Normal Affect      ------------------------------ ED COURSE/MEDICAL DECISION MAKING----------------------  Medications - No data to display      ED COURSE:       Medical Decision Making:    Patient is an 51-year-old female presenting to urgent care today with cellulitis to her right lower leg. Otherwise she is nontoxic-appearing, afebrile, no acute distress. At this time we will initiate antibiotics and discussed wound care with patient and her daughter. Recommended close follow-up with PCP for recheck and return to the emergency department with any new or worsening symptoms. Return precautions were discussed.   Patient and her daughter voiced understanding and are agreeable to the above treatment plan.    Counseling: The emergency provider has spoken with the family member patient and daughter and discussed todays results, in addition to providing specific details for the plan of care and counseling regarding the diagnosis and prognosis. Questions are answered at this time and they are agreeable with the plan.      --------------------------------- IMPRESSION AND DISPOSITION ---------------------------------    IMPRESSION  1. Cellulitis of right lower extremity        DISPOSITION  Disposition: Discharge to home  Patient condition is stable      NOTE: This report was transcribed using voice recognition software.  Every effort was made to ensure accuracy; however, inadvertent computerized transcription errors may be present        Precilla Kawasaki, [place]  07/18/22 1402

## 2022-08-01 ENCOUNTER — HOSPITAL ENCOUNTER (EMERGENCY)
Age: 87
Discharge: HOME | End: 2022-08-01
Payer: MEDICARE

## 2022-08-01 ENCOUNTER — APPOINTMENT (OUTPATIENT)
Dept: GENERAL RADIOLOGY | Age: 87
End: 2022-08-01
Payer: MEDICARE

## 2022-08-01 VITALS
HEART RATE: 60 BPM | WEIGHT: 201 LBS | TEMPERATURE: 97.3 F | SYSTOLIC BLOOD PRESSURE: 180 MMHG | BODY MASS INDEX: 36.99 KG/M2 | RESPIRATION RATE: 20 BRPM | OXYGEN SATURATION: 98 % | DIASTOLIC BLOOD PRESSURE: 70 MMHG | HEIGHT: 62 IN

## 2022-08-01 DIAGNOSIS — R31.9 HEMATURIA, UNSPECIFIED TYPE: Primary | ICD-10-CM

## 2022-08-01 DIAGNOSIS — R31.9 URINARY TRACT INFECTION WITH HEMATURIA, SITE UNSPECIFIED: ICD-10-CM

## 2022-08-01 DIAGNOSIS — N39.0 URINARY TRACT INFECTION WITH HEMATURIA, SITE UNSPECIFIED: ICD-10-CM

## 2022-08-01 DIAGNOSIS — L03.115 CELLULITIS OF RIGHT LOWER EXTREMITY: ICD-10-CM

## 2022-08-01 LAB
BACTERIA: ABNORMAL /HPF
BASOPHILS ABSOLUTE: 0.04 E9/L (ref 0–0.2)
BASOPHILS RELATIVE PERCENT: 0.4 % (ref 0–2)
BILIRUBIN URINE: NEGATIVE
BLOOD, URINE: ABNORMAL
CLARITY: ABNORMAL
CO2: 28 MMOL/L (ref 22–29)
COLOR: YELLOW
EOSINOPHILS ABSOLUTE: 0.08 E9/L (ref 0.05–0.5)
EOSINOPHILS RELATIVE PERCENT: 0.9 % (ref 0–6)
EPITHELIAL CELLS, UA: ABNORMAL /HPF
GFR AFRICAN AMERICAN: >60
GFR NON-AFRICAN AMERICAN: 59 ML/MIN/1.73
GLUCOSE BLD-MCNC: 84 MG/DL (ref 74–99)
GLUCOSE URINE: NEGATIVE MG/DL
HCT VFR BLD CALC: 39.6 % (ref 34–48)
HEMOGLOBIN: 12.9 G/DL (ref 11.5–15.5)
IMMATURE GRANULOCYTES #: 0.07 E9/L
IMMATURE GRANULOCYTES %: 0.8 % (ref 0–5)
KETONES, URINE: NEGATIVE MG/DL
LEUKOCYTE ESTERASE, URINE: ABNORMAL
LYMPHOCYTES ABSOLUTE: 2.29 E9/L (ref 1.5–4)
LYMPHOCYTES RELATIVE PERCENT: 24.8 % (ref 20–42)
MCH RBC QN AUTO: 29.6 PG (ref 26–35)
MCHC RBC AUTO-ENTMCNC: 32.6 % (ref 32–34.5)
MCV RBC AUTO: 90.8 FL (ref 80–99.9)
MONOCYTES ABSOLUTE: 0.72 E9/L (ref 0.1–0.95)
MONOCYTES RELATIVE PERCENT: 7.8 % (ref 2–12)
NEUTROPHILS ABSOLUTE: 6.04 E9/L (ref 1.8–7.3)
NEUTROPHILS RELATIVE PERCENT: 65.3 % (ref 43–80)
NITRITE, URINE: NEGATIVE
PDW BLD-RTO: 13.2 FL (ref 11.5–15)
PERFORMED ON: NORMAL
PH UA: 5.5 (ref 5–9)
PLATELET # BLD: 233 E9/L (ref 130–450)
PMV BLD AUTO: 9.6 FL (ref 7–12)
POC ANION GAP: 10 MMOL/L (ref 7–16)
POC BUN: 21 MG/DL (ref 8–23)
POC CHLORIDE: 106 MMOL/L (ref 100–108)
POC CREATININE: 0.9 MG/DL (ref 0.5–1)
POC POTASSIUM: 4.4 MMOL/L (ref 3.5–5)
POC SODIUM: 144 MMOL/L (ref 132–146)
PROTEIN UA: NEGATIVE MG/DL
RBC # BLD: 4.36 E12/L (ref 3.5–5.5)
RBC UA: ABNORMAL /HPF (ref 0–2)
SPECIFIC GRAVITY UA: 1.02 (ref 1–1.03)
UROBILINOGEN, URINE: 0.2 E.U./DL
WBC # BLD: 9.2 E9/L (ref 4.5–11.5)
WBC UA: ABNORMAL /HPF (ref 0–5)

## 2022-08-01 PROCEDURE — 84520 ASSAY OF UREA NITROGEN: CPT

## 2022-08-01 PROCEDURE — 82565 ASSAY OF CREATININE: CPT

## 2022-08-01 PROCEDURE — 36415 COLL VENOUS BLD VENIPUNCTURE: CPT

## 2022-08-01 PROCEDURE — 99211 OFF/OP EST MAY X REQ PHY/QHP: CPT

## 2022-08-01 PROCEDURE — 85025 COMPLETE CBC W/AUTO DIFF WBC: CPT

## 2022-08-01 PROCEDURE — 73590 X-RAY EXAM OF LOWER LEG: CPT

## 2022-08-01 PROCEDURE — 80051 ELECTROLYTE PANEL: CPT

## 2022-08-01 PROCEDURE — 82947 ASSAY GLUCOSE BLOOD QUANT: CPT

## 2022-08-01 PROCEDURE — 81001 URINALYSIS AUTO W/SCOPE: CPT

## 2022-08-01 RX ORDER — CEFDINIR 300 MG/1
300 CAPSULE ORAL 2 TIMES DAILY
Qty: 20 CAPSULE | Refills: 0 | Status: SHIPPED | OUTPATIENT
Start: 2022-08-01 | End: 2022-08-11

## 2022-08-01 RX ORDER — CEFDINIR 300 MG/1
300 CAPSULE ORAL 2 TIMES DAILY
Qty: 20 CAPSULE | Refills: 0 | Status: SHIPPED | OUTPATIENT
Start: 2022-08-01 | End: 2022-08-01 | Stop reason: CLARIF

## 2022-08-01 ASSESSMENT — PAIN DESCRIPTION - DESCRIPTORS: DESCRIPTORS: BURNING;DISCOMFORT

## 2022-08-01 ASSESSMENT — PAIN DESCRIPTION - LOCATION: LOCATION: VAGINA

## 2022-08-01 ASSESSMENT — PAIN - FUNCTIONAL ASSESSMENT: PAIN_FUNCTIONAL_ASSESSMENT: 0-10

## 2022-08-01 NOTE — ED PROVIDER NOTES
Department of Emergency [address]  [facility]  Provider Note  Admit Date/RoomTime: 2022  4:17 PM  Room:       NAME: Kevin Mcmahon  : 1935  MRN: [id number]     Chief Complaint:  Other (States she was here two weeks ago and given two ATB and her right lower leg is still edematous/red/warm to touch -states she feels like there might be a splinter in her foot ) and Vaginal Bleeding (Has used 3 pads today and its very painful had total hysterectomy )    History of Present Illness       Kevin Mcmahon is a 80 y.o. old female who has a past medical history of:   Past Medical History:   Diagnosis Date    Aldosterone-secreting adenoma     f/w Dr [facility] - Endocrinology    CAD (coronary artery disease)     f/w Dr Janine Draper    Diverticulosis     GERD (gastroesophageal reflux disease)     Osteoarthritis     RSV (respiratory syncytial virus infection) 2019    Sleep apnea     presents to the urgent care center by private vehicle, for bleeding. She wears a nahid  pad because of urinary incontinence and she said there has been some blood on it. So she is concerned that she has vaginal bleeding however she said she had a hysterectomy. She is not complaining of any abdominal pain or back pain. She does have a cellulitis of the right lower extremity ---she has been on antibiotics for that it started to improve but is still not totally resolved she is wondering if there could be a foreign body in the right lower leg because she ran into a pile of wood when this all started few weeks ago. She does not have an appointment with her family doctor until September so she came back here for further evaluation of the leg and also for the blood that is on her peripad. She said that she had some blood on the Peripad during the night she has not had any today. ROS    Pertinent positives and negatives are stated within HPI, all other systems reviewed and are negative.     Past Surgical History:   Procedure Laterality Date    COLONOSCOPY      EYE SURGERY      HERNIA REPAIR      HYSTERECTOMY (CERVIX STATUS UNKNOWN)      KIDNEY SURGERY      UPPER GASTROINTESTINAL ENDOSCOPY     Social History:  reports that she has never smoked. She has never used smokeless tobacco. She reports that she does not drink alcohol and does not use drugs. Family History: family history includes Hypertension in her father. Allergies: Iodine, Sulfa antibiotics, and Macrodantin [nitrofurantoin macrocrystal]    Physical Exam      Oxygen Saturation Interpretation: Normal.        ED Triage Vitals [08/01/22 1623]   BP Temp Temp Source Heart Rate Resp SpO2 Height Weight   (!) 180/70 97.3 °F (36.3 °C) Infrared 60 20 98 % 5' 2\" (1.575 m) 201 lb (91.2 kg)         Constitutional:  Alert, no distress, very hard of hearing  HEENT:  NC/NT. Neck:  Normal ROM. Supple. Respiratory:  Lungs Clear to auscultation and breath sounds equal.  CV:  Regular rate and rhythm, normal heart sounds, without pathological murmurs, ectopy, gallops, or rubs. GI:  soft not tender  Back: CVA Tenderness: No CVA tenderness. Integument:    Warm, dry, without visible rash, unless noted elsewhere. Lower extremity is erythematous above the ankle she does have lymphedema of both lower extremities she has some erythema that is approximately 10 cm x 5 cm  on the lower extremity. there is a wound there this not totally closed but there is no drainage there is no palpable foreign bodies there  Lymphatics: No lymphangitis or adenopathy noted. Neurological:  Oriented. Motor functions intact.     Lab / Imaging Results   (All laboratory and radiology results have been personally reviewed by myself)  Labs:  Results for orders placed or performed during the hospital encounter of 08/01/22   Urinalysis   Result Value Ref Range    Color, UA Yellow Straw/Yellow    Clarity, UA SL CLOUDY Clear    Glucose, Ur Negative Negative mg/dL    Bilirubin Urine Negative Negative    Ketones, Urine Negative Negative mg/dL    Specific Gravity, UA 1.020 1.005 - 1.030    Blood, Urine MODERATE (A) Negative    pH, UA 5.5 5.0 - 9.0    Protein, UA Negative Negative mg/dL    Urobilinogen, Urine 0.2 <2.0 E.U./dL    Nitrite, Urine Negative Negative    Leukocyte Esterase, Urine LARGE (A) Negative   CBC with Auto Differential   Result Value Ref Range    WBC 9.2 4.5 - 11.5 E9/L    RBC 4.36 3.50 - 5.50 E12/L    Hemoglobin 12.9 11.5 - 15.5 g/dL    Hematocrit 39.6 34.0 - 48.0 %    MCV 90.8 80.0 - 99.9 fL    MCH 29.6 26.0 - 35.0 pg    MCHC 32.6 32.0 - 34.5 %    RDW 13.2 11.5 - 15.0 fL    Platelets 298 048 - 379 E9/L    MPV 9.6 7.0 - 12.0 fL    Neutrophils % 65.3 43.0 - 80.0 %    Immature Granulocytes % 0.8 0.0 - 5.0 %    Lymphocytes % 24.8 20.0 - 42.0 %    Monocytes % 7.8 2.0 - 12.0 %    Eosinophils % 0.9 0.0 - 6.0 %    Basophils % 0.4 0.0 - 2.0 %    Neutrophils Absolute 6.04 1.80 - 7.30 E9/L    Immature Granulocytes # 0.07 E9/L    Lymphocytes Absolute 2.29 1.50 - 4.00 E9/L    Monocytes Absolute 0.72 0.10 - 0.95 E9/L    Eosinophils Absolute 0.08 0.05 - 0.50 E9/L    Basophils Absolute 0.04 0.00 - 0.20 E9/L   Microscopic Urinalysis   Result Value Ref Range    WBC, UA 5-10 (A) 0 - 5 /HPF    RBC, UA 1-3 0 - 2 /HPF    Epithelial Cells, UA FEW /HPF    Bacteria, UA MODERATE (A) None Seen /HPF   POCT Venous   Result Value Ref Range    POC Sodium 144 132 - 146 mmol/L    POC Potassium 4.4 3.5 - 5.0 mmol/L    POC Chloride 106 100 - 108 mmol/L    CO2 28 22 - 29 mmol/L    POC Anion Gap 10 7 - 16 mmol/L    POC Glucose 84 74 - 99 mg/dl    POC BUN 21 8 - 23 mg/dL    POC Creatinine 0.9 0.5 - 1.0 mg/dL    GFR Non-African American 59 >=60 mL/min/1.73    GFR  >60     Performed on SEE BELOW        Imaging: All Radiology results interpreted by Radiologist unless otherwise noted. XR TIBIA FIBULA RIGHT (2 VIEWS)   Final Result   No radiopaque foreign body. No acute osseous abnormality.            ED Course / Medical Decision Making   Medications - No data to display       Consults:   None    Medical Decision Making:    Patient is well appearing, non toxic and appropriate for outpatient management. Said she is having a blood on her. peripad that she wears for incontinence. She  had a hysterectomy so I did tell her that this would not be vaginal most likely is urinary we did check a urine on her and she does have microscopic blood in her urine along with a UTI. I did put her on Omnicef to treat the UTI I advised her she needs to follow-up with urology if she is having bleeding just to make sure that there is no problems with the bladder and I did refer her up. For the cellulitis of the right lower extremity is improving they did show me a picture of it 2 weeks ago -- it has improved but has not totally resolved she is concerned there could be a foreign body I did do an x-ray and there is no visible  radiopaque foreign bodies. I do not palpate or see any foreign bodies but she does have a puncture wound there. The [address] should take care of the further cellulitis there she should follow-up with her doctor for wound recheck as soon as possible. Plan of Care/Counseling:  I reviewed today's visit with the patient in addition to providing specific details for the plan of care and counseling regarding the diagnosis and prognosis. Questions are answered at this time and are agreeable with the plan. Assessment      1. Hematuria, unspecified type    2. Urinary tract infection with hematuria, site unspecified    3.  Cellulitis of right lower extremity      Plan   Disposition: Discharge to home and advised to contact Barbie Arthur, [address] [place] [address] [address]  [phone]    Schedule an appointment as soon as possible for a visit       Claire Clinton MD  [facility] [place] [facility] [address] [place] [address]  [phone]    Schedule an appointment as soon as possible for a visit      Patient condition is good    New Medications     New Prescriptions    CEFDINIR (OMNICEF) 300 MG CAPSULE    Take 1 capsule by mouth in the morning and 1 capsule before bedtime. Do all this for 10 days. Electronically signed by RUTHANN Blas CNP   DD: 8/1/22  **This report was transcribed using voice recognition software. Every effort was made to ensure accuracy; however, inadvertent computerized transcription errors may be present.   END OF ED PROVIDER NOTE            RUTHANN Blas CNP  08/01/22 [address], RUTHANN Goel CNP  08/01/22 2050

## 2022-09-30 ENCOUNTER — HOSPITAL ENCOUNTER (OUTPATIENT)
Age: 87
Discharge: HOME | End: 2022-09-30
Payer: MEDICARE

## 2022-09-30 LAB
ALBUMIN SERPL-MCNC: 4.3 G/DL (ref 3.5–5.2)
ALP BLD-CCNC: 65 U/L (ref 35–104)
ALT SERPL-CCNC: 12 U/L (ref 0–32)
ANION GAP SERPL CALCULATED.3IONS-SCNC: 9 MMOL/L (ref 7–16)
AST SERPL-CCNC: 19 U/L (ref 0–31)
BILIRUB SERPL-MCNC: 0.6 MG/DL (ref 0–1.2)
BUN BLDV-MCNC: 21 MG/DL (ref 6–23)
CALCIUM SERPL-MCNC: 9.4 MG/DL (ref 8.6–10.2)
CHLORIDE BLD-SCNC: 102 MMOL/L (ref 98–107)
CHOLESTEROL, FASTING: 184 MG/DL (ref 0–199)
CO2: 28 MMOL/L (ref 22–29)
CORTISOL TOTAL: 9.9 MCG/DL (ref 2.68–18.4)
CREAT SERPL-MCNC: 0.8 MG/DL (ref 0.5–1)
FOLATE: >20 NG/ML (ref 4.8–24.2)
GFR AFRICAN AMERICAN: >60
GFR NON-AFRICAN AMERICAN: >60 ML/MIN/1.73
GLUCOSE FASTING: 91 MG/DL (ref 74–99)
HCT VFR BLD CALC: 39.6 % (ref 34–48)
HDLC SERPL-MCNC: 36 MG/DL
HEMOGLOBIN: 12.8 G/DL (ref 11.5–15.5)
IRON SATURATION: 37 % (ref 15–50)
IRON: 105 MCG/DL (ref 37–145)
LDL CHOLESTEROL CALCULATED: 132 MG/DL (ref 0–99)
MCH RBC QN AUTO: 30.3 PG (ref 26–35)
MCHC RBC AUTO-ENTMCNC: 32.3 % (ref 32–34.5)
MCV RBC AUTO: 93.8 FL (ref 80–99.9)
PDW BLD-RTO: 13.6 FL (ref 11.5–15)
PLATELET # BLD: 197 E9/L (ref 130–450)
PMV BLD AUTO: 10.1 FL (ref 7–12)
POTASSIUM SERPL-SCNC: 4.2 MMOL/L (ref 3.5–5)
RBC # BLD: 4.22 E12/L (ref 3.5–5.5)
SODIUM BLD-SCNC: 139 MMOL/L (ref 132–146)
TOTAL IRON BINDING CAPACITY: 283 MCG/DL (ref 250–450)
TOTAL PROTEIN: 7.2 G/DL (ref 6.4–8.3)
TRIGLYCERIDE, FASTING: 80 MG/DL (ref 0–149)
TSH SERPL DL<=0.05 MIU/L-ACNC: 2.48 UIU/ML (ref 0.27–4.2)
VITAMIN B-12: 435 PG/ML (ref 211–946)
VITAMIN D 25-HYDROXY: 23 NG/ML (ref 30–100)
VLDLC SERPL CALC-MCNC: 16 MG/DL
WBC # BLD: 6.2 E9/L (ref 4.5–11.5)

## 2022-09-30 PROCEDURE — 82746 ASSAY OF FOLIC ACID SERUM: CPT

## 2022-09-30 PROCEDURE — 80061 LIPID PANEL: CPT

## 2022-09-30 PROCEDURE — 36415 COLL VENOUS BLD VENIPUNCTURE: CPT

## 2022-09-30 PROCEDURE — 82088 ASSAY OF ALDOSTERONE: CPT

## 2022-09-30 PROCEDURE — 83550 IRON BINDING TEST: CPT

## 2022-09-30 PROCEDURE — 82533 TOTAL CORTISOL: CPT

## 2022-09-30 PROCEDURE — 82607 VITAMIN B-12: CPT

## 2022-09-30 PROCEDURE — 85027 COMPLETE CBC AUTOMATED: CPT

## 2022-09-30 PROCEDURE — 83540 ASSAY OF IRON: CPT

## 2022-09-30 PROCEDURE — 84244 ASSAY OF RENIN: CPT

## 2022-09-30 PROCEDURE — 82306 VITAMIN D 25 HYDROXY: CPT

## 2022-09-30 PROCEDURE — 80053 COMPREHEN METABOLIC PANEL: CPT

## 2022-09-30 PROCEDURE — 84443 ASSAY THYROID STIM HORMONE: CPT

## 2022-10-04 LAB — ALDOSTERONE: 23.3 NG/DL

## 2022-10-06 LAB — RENIN ACTIVITY: 1.1 NG/ML/HR

## 2024-06-09 ENCOUNTER — HOSPITAL ENCOUNTER (EMERGENCY)
Age: 89
Discharge: HOME | End: 2024-06-09
Payer: MEDICARE

## 2024-06-09 ENCOUNTER — APPOINTMENT (OUTPATIENT)
Dept: GENERAL RADIOLOGY | Age: 89
End: 2024-06-09
Payer: MEDICARE

## 2024-06-09 VITALS
DIASTOLIC BLOOD PRESSURE: 90 MMHG | HEART RATE: 62 BPM | RESPIRATION RATE: 22 BRPM | WEIGHT: 185 LBS | OXYGEN SATURATION: 95 % | TEMPERATURE: 98.5 F | BODY MASS INDEX: 33.84 KG/M2 | SYSTOLIC BLOOD PRESSURE: 151 MMHG

## 2024-06-09 DIAGNOSIS — S81.852A CAT BITE OF LEFT LOWER LEG, INITIAL ENCOUNTER: Primary | ICD-10-CM

## 2024-06-09 DIAGNOSIS — Z23 NEED FOR TETANUS BOOSTER: ICD-10-CM
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
## 2024-06-09 PROCEDURE — 90471 IMMUNIZATION ADMIN: CPT | Performed by: PHYSICIAN ASSISTANT

## 2024-06-09 PROCEDURE — 96372 THER/PROPH/DIAG INJ SC/IM: CPT

## 2024-06-09 PROCEDURE — 6370000000 HC RX 637 (ALT 250 FOR IP): Performed by: PHYSICIAN ASSISTANT

## 2024-06-09 PROCEDURE — 90472 IMMUNIZATION ADMIN EACH ADD: CPT | Performed by: PHYSICIAN ASSISTANT

## 2024-06-09 PROCEDURE — 6360000002 HC RX W HCPCS: Performed by: PHYSICIAN ASSISTANT

## 2024-06-09 PROCEDURE — 90715 TDAP VACCINE 7 YRS/> IM: CPT | Performed by: PHYSICIAN ASSISTANT

## 2024-06-09 PROCEDURE — 90675 RABIES VACCINE IM: CPT | Performed by: PHYSICIAN ASSISTANT

## 2024-06-09 PROCEDURE — 73590 X-RAY EXAM OF LOWER LEG: CPT

## 2024-06-09 PROCEDURE — 99284 EMERGENCY DEPT VISIT MOD MDM: CPT

## 2024-06-09 RX ORDER — AMOXICILLIN AND CLAVULANATE POTASSIUM 875; 125 MG/1; MG/1
1 TABLET, FILM COATED ORAL ONCE
Status: COMPLETED | OUTPATIENT
Start: 2024-06-09 | End: 2024-06-09

## 2024-06-09 RX ORDER — AMOXICILLIN AND CLAVULANATE POTASSIUM 875; 125 MG/1; MG/1
1 TABLET, FILM COATED ORAL 2 TIMES DAILY
Qty: 20 TABLET | Refills: 0 | Status: SHIPPED | OUTPATIENT
Start: 2024-06-09 | End: 2024-06-19

## 2024-06-09 RX ADMIN — TETANUS TOXOID, REDUCED DIPHTHERIA TOXOID AND ACELLULAR PERTUSSIS VACCINE, ADSORBED 0.5 ML: 5; 2.5; 8; 8; 2.5 SUSPENSION INTRAMUSCULAR at 18:17

## 2024-06-09 RX ADMIN — AMOXICILLIN AND CLAVULANATE POTASSIUM 1 TABLET: 875; 125 TABLET, FILM COATED ORAL at 18:21

## 2024-06-09 RX ADMIN — RABIES VACCINE 1 ML: KIT at 18:13

## 2024-06-09 ASSESSMENT — LIFESTYLE VARIABLES: HOW OFTEN DO YOU HAVE A DRINK CONTAINING ALCOHOL: NEVER

## 2024-06-09 NOTE — ED PROVIDER NOTES
initial encounter    2. Need for tetanus booster        DISPOSITION  Disposition: Discharge to home  Patient condition is stable          Sheron Kumar PA  06/09/24 2040

## 2024-06-10 NOTE — DISCHARGE INSTRUCTIONS
Please return to the ED with new or worsening symptoms. Follow up with PCP for recheck.   Also follow with the infusion center for further rabies vaccinations.

## 2024-06-12 ENCOUNTER — HOSPITAL ENCOUNTER (OUTPATIENT)
Dept: INFUSION THERAPY | Age: 89
Setting detail: INFUSION SERIES
Discharge: HOME | End: 2024-06-12
Payer: MEDICARE

## 2024-06-12 VITALS
SYSTOLIC BLOOD PRESSURE: 193 MMHG | DIASTOLIC BLOOD PRESSURE: 76 MMHG | TEMPERATURE: 98 F | OXYGEN SATURATION: 96 % | HEART RATE: 90 BPM | RESPIRATION RATE: 24 BRPM

## 2024-06-12 DIAGNOSIS — S81.852D CAT BITE OF LEFT LOWER LEG, SUBSEQUENT ENCOUNTER: Primary | ICD-10-CM
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
## 2024-06-12 PROBLEM — S81.852A CAT BITE OF LEFT LOWER LEG: Status: ACTIVE | Noted: 2024-06-12

## 2024-06-12 PROCEDURE — 90471 IMMUNIZATION ADMIN: CPT | Performed by: PHYSICIAN ASSISTANT

## 2024-06-12 PROCEDURE — 96372 THER/PROPH/DIAG INJ SC/IM: CPT

## 2024-06-12 PROCEDURE — 6360000002 HC RX W HCPCS: Performed by: PHYSICIAN ASSISTANT

## 2024-06-12 PROCEDURE — 90675 RABIES VACCINE IM: CPT | Performed by: PHYSICIAN ASSISTANT

## 2024-06-12 RX ADMIN — RABIES VACCINE 1 ML: KIT at 13:08

## 2024-06-16 ENCOUNTER — HOSPITAL ENCOUNTER (OUTPATIENT)
Dept: INFUSION THERAPY | Age: 89
Setting detail: INFUSION SERIES
Discharge: HOME | End: 2024-06-16
Payer: MEDICARE

## 2024-06-16 VITALS
SYSTOLIC BLOOD PRESSURE: 121 MMHG | RESPIRATION RATE: 20 BRPM | DIASTOLIC BLOOD PRESSURE: 68 MMHG | OXYGEN SATURATION: 96 % | HEART RATE: 58 BPM | TEMPERATURE: 97.2 F

## 2024-06-16 DIAGNOSIS — S81.852D CAT BITE OF LEFT LOWER LEG, SUBSEQUENT ENCOUNTER: Primary | ICD-10-CM
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
## 2024-06-16 PROCEDURE — 90471 IMMUNIZATION ADMIN: CPT | Performed by: PHYSICIAN ASSISTANT

## 2024-06-16 PROCEDURE — 6360000002 HC RX W HCPCS: Performed by: PHYSICIAN ASSISTANT

## 2024-06-16 PROCEDURE — 90675 RABIES VACCINE IM: CPT | Performed by: PHYSICIAN ASSISTANT

## 2024-06-16 PROCEDURE — 96372 THER/PROPH/DIAG INJ SC/IM: CPT

## 2024-06-16 RX ORDER — ERGOCALCIFEROL 1.25 MG/1
2000 CAPSULE ORAL WEEKLY
COMMUNITY

## 2024-06-16 RX ADMIN — RABIES VACCINE 1 ML: KIT at 10:44

## 2024-06-23 ENCOUNTER — HOSPITAL ENCOUNTER (OUTPATIENT)
Dept: INFUSION THERAPY | Age: 89
Setting detail: INFUSION SERIES
Discharge: HOME | End: 2024-06-23
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
## 2024-06-23 DIAGNOSIS — S81.852D CAT BITE OF LEFT LOWER LEG, SUBSEQUENT ENCOUNTER: Primary | ICD-10-CM

## 2024-06-24 ENCOUNTER — HOSPITAL ENCOUNTER (OUTPATIENT)
Dept: INFUSION THERAPY | Age: 89
Setting detail: INFUSION SERIES
Discharge: HOME | End: 2024-06-24
Payer: MEDICARE
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
## 2024-06-24 DIAGNOSIS — S81.852D CAT BITE OF LEFT LOWER LEG, SUBSEQUENT ENCOUNTER: Primary | ICD-10-CM

## 2024-06-24 PROCEDURE — 6360000002 HC RX W HCPCS: Performed by: PHYSICIAN ASSISTANT

## 2024-06-24 PROCEDURE — 90675 RABIES VACCINE IM: CPT | Performed by: PHYSICIAN ASSISTANT

## 2024-06-24 PROCEDURE — 90471 IMMUNIZATION ADMIN: CPT | Performed by: PHYSICIAN ASSISTANT

## 2024-06-24 RX ADMIN — RABIES VACCINE 1 ML: KIT at 13:20

## 2025-02-28 ENCOUNTER — APPOINTMENT (OUTPATIENT)
Dept: GENERAL RADIOLOGY | Age: 89
DRG: 682 | End: 2025-02-28
Payer: MEDICARE

## 2025-02-28 ENCOUNTER — HOSPITAL ENCOUNTER (INPATIENT)
Age: 89
LOS: 4 days | Discharge: HOME | DRG: 682 | End: 2025-03-05
Attending: EMERGENCY MEDICINE | Admitting: FAMILY MEDICINE
Payer: MEDICARE

## 2025-02-28 DIAGNOSIS — R94.31 EKG ABNORMALITIES: ICD-10-CM

## 2025-02-28 DIAGNOSIS — J11.1 INFLUENZA: Primary | ICD-10-CM

## 2025-02-28 DIAGNOSIS — R79.89 ELEVATED TROPONIN: ICD-10-CM

## 2025-02-28 LAB
ALBUMIN SERPL-MCNC: 4.3 G/DL (ref 3.5–5.2)
ALP SERPL-CCNC: 65 U/L (ref 35–104)
ALT SERPL-CCNC: 17 U/L (ref 0–32)
ANION GAP SERPL CALCULATED.3IONS-SCNC: 11 MMOL/L (ref 7–16)
AST SERPL-CCNC: 28 U/L (ref 0–31)
BASOPHILS # BLD: 0.03 K/UL (ref 0–0.2)
BASOPHILS NFR BLD: 0 % (ref 0–2)
BILIRUB SERPL-MCNC: 0.4 MG/DL (ref 0–1.2)
BUN SERPL-MCNC: 32 MG/DL (ref 6–23)
CALCIUM SERPL-MCNC: 9.8 MG/DL (ref 8.6–10.2)
CHLORIDE SERPL-SCNC: 98 MMOL/L (ref 98–107)
CK SERPL-CCNC: 81 U/L (ref 20–180)
CO2 SERPL-SCNC: 27 MMOL/L (ref 22–29)
CREAT SERPL-MCNC: 1.1 MG/DL (ref 0.5–1)
EOSINOPHIL # BLD: 0 K/UL (ref 0.05–0.5)
EOSINOPHILS RELATIVE PERCENT: 0 % (ref 0–6)
ERYTHROCYTE [DISTWIDTH] IN BLOOD BY AUTOMATED COUNT: 14 % (ref 11.5–15)
FLUAV RNA RESP QL NAA+PROBE: DETECTED
FLUBV RNA RESP QL NAA+PROBE: NOT DETECTED
GFR, ESTIMATED: 50 ML/MIN/1.73M2
GLUCOSE SERPL-MCNC: 116 MG/DL (ref 74–99)
HCT VFR BLD AUTO: 39.2 % (ref 34–48)
HGB BLD-MCNC: 12.9 G/DL (ref 11.5–15.5)
IMM GRANULOCYTES # BLD AUTO: 0.1 K/UL (ref 0–0.58)
IMM GRANULOCYTES NFR BLD: 1 % (ref 0–5)
LYMPHOCYTES NFR BLD: 0.88 K/UL (ref 1.5–4)
LYMPHOCYTES RELATIVE PERCENT: 10 % (ref 20–42)
MCH RBC QN AUTO: 30.9 PG (ref 26–35)
MCHC RBC AUTO-ENTMCNC: 32.9 G/DL (ref 32–34.5)
MCV RBC AUTO: 93.8 FL (ref 80–99.9)
MONOCYTES NFR BLD: 1.19 K/UL (ref 0.1–0.95)
MONOCYTES NFR BLD: 14 % (ref 2–12)
NEUTROPHILS NFR BLD: 74 % (ref 43–80)
NEUTS SEG NFR BLD: 6.32 K/UL (ref 1.8–7.3)
PLATELET, FLUORESCENCE: 130 K/UL (ref 130–450)
PMV BLD AUTO: 10.1 FL (ref 7–12)
POTASSIUM SERPL-SCNC: 4.6 MMOL/L (ref 3.5–5)
PROT SERPL-MCNC: 7.3 G/DL (ref 6.4–8.3)
RBC # BLD AUTO: 4.18 M/UL (ref 3.5–5.5)
SARS-COV-2 RNA RESP QL NAA+PROBE: NOT DETECTED
SODIUM SERPL-SCNC: 136 MMOL/L (ref 132–146)
SOURCE: ABNORMAL
SPECIMEN DESCRIPTION: ABNORMAL
TROPONIN I SERPL HS-MCNC: 421 NG/L (ref 0–9)
TROPONIN I SERPL HS-MCNC: 525 NG/L (ref 0–9)
WBC OTHER # BLD: 8.5 K/UL (ref 4.5–11.5)

## 2025-02-28 PROCEDURE — 85025 COMPLETE CBC W/AUTO DIFF WBC: CPT

## 2025-02-28 PROCEDURE — 82550 ASSAY OF CK (CPK): CPT

## 2025-02-28 PROCEDURE — 6370000000 HC RX 637 (ALT 250 FOR IP)

## 2025-02-28 PROCEDURE — 93005 ELECTROCARDIOGRAM TRACING: CPT

## 2025-02-28 PROCEDURE — 87636 SARSCOV2 & INF A&B AMP PRB: CPT

## 2025-02-28 PROCEDURE — 96361 HYDRATE IV INFUSION ADD-ON: CPT

## 2025-02-28 PROCEDURE — 84484 ASSAY OF TROPONIN QUANT: CPT

## 2025-02-28 PROCEDURE — 99285 EMERGENCY DEPT VISIT HI MDM: CPT

## 2025-02-28 PROCEDURE — 71045 X-RAY EXAM CHEST 1 VIEW: CPT

## 2025-02-28 PROCEDURE — 2580000003 HC RX 258

## 2025-02-28 PROCEDURE — 96360 HYDRATION IV INFUSION INIT: CPT

## 2025-02-28 PROCEDURE — 80053 COMPREHEN METABOLIC PANEL: CPT

## 2025-02-28 RX ORDER — 0.9 % SODIUM CHLORIDE 0.9 %
500 INTRAVENOUS SOLUTION INTRAVENOUS ONCE
Status: COMPLETED | OUTPATIENT
Start: 2025-02-28 | End: 2025-02-28

## 2025-02-28 RX ORDER — ASPIRIN 81 MG/1
324 TABLET, CHEWABLE ORAL ONCE
Status: COMPLETED | OUTPATIENT
Start: 2025-02-28 | End: 2025-02-28

## 2025-02-28 RX ORDER — SODIUM CHLORIDE 9 MG/ML
INJECTION, SOLUTION INTRAVENOUS
Status: COMPLETED
Start: 2025-02-28 | End: 2025-02-28

## 2025-02-28 RX ADMIN — ASPIRIN 324 MG: 81 TABLET, CHEWABLE ORAL at 21:44

## 2025-02-28 RX ADMIN — SODIUM CHLORIDE 500 ML: 0.9 INJECTION, SOLUTION INTRAVENOUS at 20:39

## 2025-02-28 RX ADMIN — Medication 500 ML: at 20:39

## 2025-02-28 ASSESSMENT — LIFESTYLE VARIABLES: HOW OFTEN DO YOU HAVE A DRINK CONTAINING ALCOHOL: NEVER

## 2025-03-01 PROBLEM — R79.89 ELEVATED TROPONIN: Status: ACTIVE | Noted: 2025-03-01

## 2025-03-01 PROBLEM — N17.9 AKI (ACUTE KIDNEY INJURY): Status: ACTIVE | Noted: 2025-03-01

## 2025-03-01 PROBLEM — R94.31 EKG ABNORMALITIES: Status: ACTIVE | Noted: 2025-03-01

## 2025-03-01 PROBLEM — J10.1 INFLUENZA A: Status: ACTIVE | Noted: 2025-03-01

## 2025-03-01 LAB
ANION GAP SERPL CALCULATED.3IONS-SCNC: 11 MMOL/L (ref 7–16)
BUN SERPL-MCNC: 25 MG/DL (ref 6–23)
CALCIUM SERPL-MCNC: 9.4 MG/DL (ref 8.6–10.2)
CHLORIDE SERPL-SCNC: 100 MMOL/L (ref 98–107)
CO2 SERPL-SCNC: 25 MMOL/L (ref 22–29)
CREAT SERPL-MCNC: 0.8 MG/DL (ref 0.5–1)
GFR, ESTIMATED: 67 ML/MIN/1.73M2
GLUCOSE SERPL-MCNC: 143 MG/DL (ref 74–99)
POTASSIUM SERPL-SCNC: 4.3 MMOL/L (ref 3.5–5)
SODIUM SERPL-SCNC: 136 MMOL/L (ref 132–146)

## 2025-03-01 PROCEDURE — 6370000000 HC RX 637 (ALT 250 FOR IP): Performed by: SPECIALIST

## 2025-03-01 PROCEDURE — 2060000000 HC ICU INTERMEDIATE R&B

## 2025-03-01 PROCEDURE — 94640 AIRWAY INHALATION TREATMENT: CPT

## 2025-03-01 PROCEDURE — 93005 ELECTROCARDIOGRAM TRACING: CPT | Performed by: SPECIALIST

## 2025-03-01 PROCEDURE — 6370000000 HC RX 637 (ALT 250 FOR IP)

## 2025-03-01 PROCEDURE — 6360000002 HC RX W HCPCS

## 2025-03-01 PROCEDURE — 80048 BASIC METABOLIC PNL TOTAL CA: CPT

## 2025-03-01 RX ORDER — POLYETHYLENE GLYCOL 3350 17 G/17G
17 POWDER, FOR SOLUTION ORAL DAILY
COMMUNITY

## 2025-03-01 RX ORDER — SPIRONOLACTONE 25 MG/1
50 TABLET ORAL DAILY
Status: DISCONTINUED | OUTPATIENT
Start: 2025-03-02 | End: 2025-03-05 | Stop reason: HOSPADM

## 2025-03-01 RX ORDER — HYDRALAZINE HYDROCHLORIDE 20 MG/ML
10 INJECTION INTRAMUSCULAR; INTRAVENOUS ONCE
Status: COMPLETED | OUTPATIENT
Start: 2025-03-01 | End: 2025-03-01

## 2025-03-01 RX ORDER — POLYETHYLENE GLYCOL 3350 17 G/17G
17 POWDER, FOR SOLUTION ORAL DAILY
Status: DISCONTINUED | OUTPATIENT
Start: 2025-03-02 | End: 2025-03-05 | Stop reason: HOSPADM

## 2025-03-01 RX ORDER — OSELTAMIVIR PHOSPHATE 75 MG/1
75 CAPSULE ORAL 2 TIMES DAILY
Status: DISCONTINUED | OUTPATIENT
Start: 2025-03-01 | End: 2025-03-05

## 2025-03-01 RX ORDER — SPIRONOLACTONE 25 MG/1
25 TABLET ORAL
Status: DISCONTINUED | OUTPATIENT
Start: 2025-03-02 | End: 2025-03-05 | Stop reason: HOSPADM

## 2025-03-01 RX ORDER — FLUTICASONE PROPIONATE 50 MCG
1 SPRAY, SUSPENSION (ML) NASAL
Status: DISCONTINUED | OUTPATIENT
Start: 2025-03-02 | End: 2025-03-05 | Stop reason: HOSPADM

## 2025-03-01 RX ORDER — M-VIT,TX,IRON,MINS/CALC/FOLIC 27MG-0.4MG
1 TABLET ORAL DAILY
Status: DISCONTINUED | OUTPATIENT
Start: 2025-03-02 | End: 2025-03-05 | Stop reason: HOSPADM

## 2025-03-01 RX ORDER — FLUTICASONE PROPIONATE 50 MCG
1 SPRAY, SUSPENSION (ML) NASAL
COMMUNITY

## 2025-03-01 RX ORDER — DIVALPROEX SODIUM 125 MG/1
250 CAPSULE, COATED PELLETS ORAL
COMMUNITY

## 2025-03-01 RX ORDER — LOPERAMIDE HYDROCHLORIDE 2 MG/1
2 CAPSULE ORAL 4 TIMES DAILY PRN
COMMUNITY

## 2025-03-01 RX ORDER — LOPERAMIDE HYDROCHLORIDE 2 MG/1
2 CAPSULE ORAL 4 TIMES DAILY PRN
Status: DISCONTINUED | OUTPATIENT
Start: 2025-03-01 | End: 2025-03-05 | Stop reason: HOSPADM

## 2025-03-01 RX ORDER — VITAMIN B COMPLEX
2000 TABLET ORAL DAILY
Status: DISCONTINUED | OUTPATIENT
Start: 2025-03-02 | End: 2025-03-05 | Stop reason: HOSPADM

## 2025-03-01 RX ORDER — IPRATROPIUM BROMIDE AND ALBUTEROL SULFATE 2.5; .5 MG/3ML; MG/3ML
1 SOLUTION RESPIRATORY (INHALATION) ONCE
Status: COMPLETED | OUTPATIENT
Start: 2025-03-01 | End: 2025-03-01

## 2025-03-01 RX ORDER — DIVALPROEX SODIUM 125 MG/1
250 CAPSULE, COATED PELLETS ORAL
Status: DISCONTINUED | OUTPATIENT
Start: 2025-03-02 | End: 2025-03-05 | Stop reason: HOSPADM

## 2025-03-01 RX ORDER — SPIRONOLACTONE 50 MG/1
50 TABLET, FILM COATED ORAL DAILY
COMMUNITY

## 2025-03-01 RX ORDER — HYDROCORTISONE 25 MG/G
CREAM TOPICAL 2 TIMES DAILY
Status: DISCONTINUED | OUTPATIENT
Start: 2025-03-02 | End: 2025-03-02 | Stop reason: CLARIF

## 2025-03-01 RX ORDER — HYDROCORTISONE 25 MG/G
CREAM TOPICAL 2 TIMES DAILY
COMMUNITY

## 2025-03-01 RX ORDER — HALOPERIDOL 5 MG/ML
2.5 INJECTION INTRAMUSCULAR ONCE
Status: COMPLETED | OUTPATIENT
Start: 2025-03-01 | End: 2025-03-01

## 2025-03-01 RX ADMIN — OSELTAMIVIR PHOSPHATE 75 MG: 75 CAPSULE ORAL at 20:39

## 2025-03-01 RX ADMIN — HALOPERIDOL LACTATE 2.5 MG: 5 INJECTION, SOLUTION INTRAMUSCULAR at 01:34

## 2025-03-01 RX ADMIN — OSELTAMIVIR PHOSPHATE 75 MG: 75 CAPSULE ORAL at 10:14

## 2025-03-01 RX ADMIN — IPRATROPIUM BROMIDE AND ALBUTEROL SULFATE 1 DOSE: .5; 3 SOLUTION RESPIRATORY (INHALATION) at 04:58

## 2025-03-01 RX ADMIN — HYDRALAZINE HYDROCHLORIDE 10 MG: 20 INJECTION INTRAMUSCULAR; INTRAVENOUS at 05:41

## 2025-03-01 ASSESSMENT — LIFESTYLE VARIABLES
HOW OFTEN DO YOU HAVE A DRINK CONTAINING ALCOHOL: NEVER
HOW MANY STANDARD DRINKS CONTAINING ALCOHOL DO YOU HAVE ON A TYPICAL DAY: PATIENT DOES NOT DRINK

## 2025-03-01 NOTE — PROGRESS NOTES
4 Eyes Skin Assessment     NAME:  Ivon Lowry  YOB: 1935  MEDICAL RECORD NUMBER:  [id number]    The patient is being assessed for  Admission    I agree that at least one RN has performed a thorough Head to Toe Skin Assessment on the patient. ALL assessment sites listed below have been assessed.      Areas assessed by both nurses:    Head, Face, Ears, Shoulders, Back, Chest, Arms, Elbows, Hands, Sacrum. Buttock, Coccyx, Ischium, and Legs. Feet and Heels        Does the Patient have a Wound? Yes wound(s) were present on assessment. LDA wound assessment was Initiated and completed by RN. Perianal blanchable redness.        Luis Prevention initiated by RN: Yes  Wound Care Orders initiated by RN: Yes    Pressure Injury (Stage 3,4, Unstageable, DTI, NWPT, and Complex wounds) if present, place Wound referral order by RN under : No    New Ostomies, if present place, Ostomy referral order under : No     Nurse 1 eSignature: Electronically signed by Sonido Taylor RN on 3/1/25 at 6:18 PM EST    **SHARE this note so that the co-signing nurse can place an eSignature**    Nurse 2 eSignature: Electronically signed by Debra Acosta RN on 3/1/25 at 6:22 PM EST

## 2025-03-01 NOTE — CONSULTS
CONSULTATION NOTE :ID     Patient - Ivon Lowry,  Age - 89 y.o.    - 1935      Room Number -    MRN -  [id number]   [facility] # - [id number]  Date of Admission -  2025  8:02 PM  Patient's PCP: Joe Jones DO     Requesting Physician: Brenda Carrasco DO    REASON FOR CONSULTATION   atbx  HISTORY OF PRESENT ILLNESS   This is a 89 y.o. female who was admitted on 2025   to the hospital with a chief complaints of   Chief Complaint   Patient presents with    Shortness of Breath     C/O shortness of breath and productive cough for 4 days. Patient from [facility].      ID was consulted on 25  Pt presents with SOB  cough NO f/c\  pt was seen and evaluste din ER  pt has sitter   Lethargic arousable on o2   Poor historian   Afebrile on o2 vs satble   Cr1.1->0.8  Wbc 8.5   FLU A +  XR CHEST PORTABLE    Result Date: 2025  No pneumonia or pleural effusion.          PAST MEDICAL AND SURGICAL HISTORY     Past Medical History:   Diagnosis Date    Aldosterone-secreting adenoma     f/w Dr Marin - Endocrinology    CAD (coronary artery disease)     f/w Dr Carcamo    Diverticulosis     GERD (gastroesophageal reflux disease)     Osteoarthritis     RSV (respiratory syncytial virus infection) 2019    Sleep apnea        Past Surgical History:   Procedure Laterality Date    COLONOSCOPY      EYE SURGERY      HERNIA REPAIR      HYSTERECTOMY (CERVIX STATUS UNKNOWN)      KIDNEY SURGERY      UPPER GASTROINTESTINAL ENDOSCOPY         MEDICATIONS PRIOR TO ADMISSION:     Scheduled Meds:  Continuous Infusions:  PRN Meds:    ALLERGIES:    Iodine, Sulfa antibiotics, and Macrodantin [nitrofurantoin macrocrystal]  SOCIAL HISTORY:   TOBACCO:   reports that she has never smoked. She has never used smokeless tobacco.     ETOH:   reports no history of alcohol use.  Patient currently lives     FAMILY HISTORY:         Problem Relation Age of Onset    Hypertension Father        REVIEW  decisions.  Negative results must be combined with clinical observations, patient history, and   epidemiological information.        Fact sheet for Patients: https://www.fda.gov/media/177147/download  Fact sheet for Healthcare Providers: https://www.fda.gov/media/985789/download          Influenza A DETECTED     Influenza B Not Detected            Problem list of patient      Patient Active Problem List   Diagnosis Code    Hypokalemia E87.6    Acute respiratory failure with hypoxemia (HCC) J96.01    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    CAD (coronary artery disease) I25.10    RSV (respiratory syncytial virus infection) B33.8    Pneumonitis J98.4    Chronic pain syndrome G89.4    Lumbar facet arthropathy M47.816    Lumbar radiculopathy M54.16    Lumbar disc disorder M51.9    Spinal stenosis of lumbar region without neurogenic claudication M48.061    Closed compression fracture of thoracic vertebra (HCC) S22.000A    Thoracic radiculopathy M54.14    Cat bite of left lower leg S81.852A, W55.01XA    EKG abnormalities R94.31    Influenza A J10.1    Elevated troponin R79.89    IMTIAZ (acute kidney injury) N17.9         ASSESSMENT AND PLAN   Flu A started tamilu on o2  Check ua urine cx has confusion also     Infection Control Recommendations   [place] Precautions droplet  May Catheter none   Central Line piv  Wound care         Thank you Brenda Carrasco DO for allowing me to participate in this patient's care.  Please call [phone]  for any questions or concerns.    Electronically signed by Judy Oh MD on 3/1/25 at 9:41 AM EST

## 2025-03-01 NOTE — CONSULTS
Consult Note            Date:3/1/2025        Patient Name:Ivon Lowry     YOB: 1935     Age:89 y.o.    Consults    Chief Complaint     Chief Complaint   Patient presents with    Shortness of Breath     C/O shortness of breath and productive cough for 4 days. Patient from [facility].           History Obtained From     Chart    History of Present Illness     Patient is 89-year-old lady who I saw in the past.    She has problem with coronary artery disease.  I need to review her record in my office.    She presented to the hospital with shortness of breath with sputum productive cough started about 4 days prior to admission    She was felt to have pneumonia/sepsis    Troponin level came elevated.  Cardiac consult was requested    I came to see the patient while she was still in the emergency room.  She was restless and agitated.  She could not answer any of my questions.  She was never like that before.        Past Medical History     Past Medical History:   Diagnosis Date    Aldosterone-secreting adenoma     f/w Dr Marin - Endocrinology    CAD (coronary artery disease)     f/w Dr Carcamo    Diverticulosis     GERD (gastroesophageal reflux disease)     Osteoarthritis     RSV (respiratory syncytial virus infection) 01/2019    Sleep apnea         Past Surgical History     Past Surgical History:   Procedure Laterality Date    COLONOSCOPY      EYE SURGERY      HERNIA REPAIR      HYSTERECTOMY (CERVIX STATUS UNKNOWN)      KIDNEY SURGERY      UPPER GASTROINTESTINAL ENDOSCOPY          Medications     Prior to Admission medications    Medication Sig Start Date End Date Taking? Authorizing Provider   vitamin D (ERGOCALCIFEROL) 1.25 MG (89850 UT) CAPS capsule Take 2,000 Units by mouth once a week    Maddie Burton MD   SPIRONOLACTONE PO Take by mouth    Maddie Burton MD   FLUTICASONE FUROATE IN Inhale into the lungs    Maddie Burton MD   Multiple Vitamins-Minerals (PRESERVISION  AREDS 2+MULTI VIT PO) Take by mouth    ProviderMaddie MD   CPAP Machine MISC by Does not apply route    ProviderMaddie MD        oseltamivir (TAMIFLU) capsule 75 mg, BID        Allergies   Iodine, Sulfa antibiotics, and Macrodantin [nitrofurantoin macrocrystal]    Social History     Social History       Tobacco History       Smoking Status  Never      Smokeless Tobacco Use  Never              Alcohol History       Alcohol Use Status  No              Drug Use       Drug Use Status  No              Sexual Activity       Sexually Active  Never                    Family History     Family History   Problem Relation Age of Onset    Hypertension Father        Review of Systems     I could not get review of system from the patient due to her mental status    Physical Exam   BP (!) 152/76   Pulse 87   Temp 97.8 °F (36.6 °C)   Resp 26   SpO2 95%        General Appearance: Confused and agitated  Head:   Normocephalic, without obvious abnormality, atraumatic  HEENT: Fairly unremarkable findings.    Neck:   Supple, no JVD  Lungs: Mildly diminished breath sounds in the bases, no wheezing  Chest Wall: No tenderness or deformity  Heart:  Regular rate and rhythm, S1, S2 normal, 1/6 systolic murmur heard best at the left sternal border, no rubs.  Abdomen:  Soft, non-tender.  Extremities:  no cyanosis or edema  Skin: Skin color, texture, turgor normal, no rashes or lesions  Neurologic: Patient is moving her extremities spontaneously.  No focal deficits    Labs    CBC:  Recent Labs     02/28/25 2032   WBC 8.5   RBC 4.18   HGB 12.9   HCT 39.2   MCV 93.8   RDW 14.0     CHEMISTRIES:  Recent Labs     02/28/25 2032 03/01/25  0714    136   K 4.6 4.3   CL 98 100   CO2 27 25   BUN 32* 25*   CREATININE 1.1* 0.8   GLUCOSE 116* 143*     PT/INR:No results for input(s): \"PROTIME\", \"INR\" in the last 72 hours.  APTT:No results for input(s): \"APTT\" in the last 72 hours.  LIVER PROFILE:  Recent Labs     02/28/25 2032   AST  28   ALT 17   BILITOT 0.4   ALKPHOS 65       Imaging/Diagnostics   XR CHEST PORTABLE    Result Date: 2/28/2025  No pneumonia or pleural effusion.       Assessment      Altered mental status  Shortness of breath with cough  Elevated troponin level  Coronary artery disease  Possible sepsis/UTI  Confusion    Patient was brought to the emergency room because of some cough and chest congestion    She was started on antibiotics    From the cardiac standpoint she had elevated troponin level around 525 on admission.  Repeat level came down to 421 consistent with non-STEMI    She has problem with coronary artery disease as I stated above    I could not find EKG in the system and I ordered one    Patient is confused and somewhat agitated and with her mental status I do not feel she is suitable for any cardiac workup at this point    Electronically signed by Rosy Carcamo MD on 3/1/25 at 10:21 AM EST

## 2025-03-02 LAB
AMORPH SED URNS QL MICRO: PRESENT
ANION GAP SERPL CALCULATED.3IONS-SCNC: 9 MMOL/L (ref 7–16)
BACTERIA URNS QL MICRO: ABNORMAL
BILIRUB UR QL STRIP: ABNORMAL
BUN SERPL-MCNC: 31 MG/DL (ref 6–23)
CALCIUM SERPL-MCNC: 9.7 MG/DL (ref 8.6–10.2)
CHLORIDE SERPL-SCNC: 98 MMOL/L (ref 98–107)
CLARITY UR: ABNORMAL
CO2 SERPL-SCNC: 28 MMOL/L (ref 22–29)
COLOR UR: YELLOW
CREAT SERPL-MCNC: 1 MG/DL (ref 0.5–1)
CRYSTALS URNS MICRO: ABNORMAL /HPF
GFR, ESTIMATED: 57 ML/MIN/1.73M2
GLUCOSE SERPL-MCNC: 100 MG/DL (ref 74–99)
GLUCOSE UR STRIP-MCNC: NEGATIVE MG/DL
HGB UR QL STRIP.AUTO: ABNORMAL
KETONES UR STRIP-MCNC: ABNORMAL MG/DL
LEUKOCYTE ESTERASE UR QL STRIP: NEGATIVE
NITRITE UR QL STRIP: POSITIVE
PH UR STRIP: 7 [PH] (ref 5–8)
POTASSIUM SERPL-SCNC: 4 MMOL/L (ref 3.5–5)
PROT UR STRIP-MCNC: >=300 MG/DL
RBC #/AREA URNS HPF: ABNORMAL /HPF
SODIUM SERPL-SCNC: 135 MMOL/L (ref 132–146)
SP GR UR STRIP: >1.03 (ref 1–1.03)
UROBILINOGEN UR STRIP-ACNC: 1 EU/DL (ref 0–1)
WBC #/AREA URNS HPF: ABNORMAL /HPF

## 2025-03-02 PROCEDURE — 87205 SMEAR GRAM STAIN: CPT

## 2025-03-02 PROCEDURE — 87086 URINE CULTURE/COLONY COUNT: CPT

## 2025-03-02 PROCEDURE — 87070 CULTURE OTHR SPECIMN AEROBIC: CPT

## 2025-03-02 PROCEDURE — 80048 BASIC METABOLIC PNL TOTAL CA: CPT

## 2025-03-02 PROCEDURE — 6370000000 HC RX 637 (ALT 250 FOR IP): Performed by: SPECIALIST

## 2025-03-02 PROCEDURE — 2060000000 HC ICU INTERMEDIATE R&B

## 2025-03-02 PROCEDURE — 6370000000 HC RX 637 (ALT 250 FOR IP): Performed by: FAMILY MEDICINE

## 2025-03-02 PROCEDURE — 81001 URINALYSIS AUTO W/SCOPE: CPT

## 2025-03-02 PROCEDURE — 36415 COLL VENOUS BLD VENIPUNCTURE: CPT

## 2025-03-02 RX ORDER — HYDROCORTISONE 25 MG/G
CREAM TOPICAL 2 TIMES DAILY
Status: DISCONTINUED | OUTPATIENT
Start: 2025-03-02 | End: 2025-03-05 | Stop reason: HOSPADM

## 2025-03-02 RX ORDER — METOPROLOL TARTRATE 25 MG/1
12.5 TABLET, FILM COATED ORAL 2 TIMES DAILY
Status: DISCONTINUED | OUTPATIENT
Start: 2025-03-02 | End: 2025-03-05 | Stop reason: HOSPADM

## 2025-03-02 RX ORDER — METOPROLOL TARTRATE 25 MG/1
25 TABLET, FILM COATED ORAL 2 TIMES DAILY
Status: DISCONTINUED | OUTPATIENT
Start: 2025-03-02 | End: 2025-03-02

## 2025-03-02 RX ORDER — ASPIRIN 81 MG/1
81 TABLET ORAL DAILY
Status: DISCONTINUED | OUTPATIENT
Start: 2025-03-02 | End: 2025-03-05 | Stop reason: HOSPADM

## 2025-03-02 RX ADMIN — DIVALPROEX SODIUM 250 MG: 125 CAPSULE, COATED PELLETS ORAL at 11:54

## 2025-03-02 RX ADMIN — METOPROLOL TARTRATE 25 MG: 25 TABLET, FILM COATED ORAL at 11:54

## 2025-03-02 RX ADMIN — OSELTAMIVIR PHOSPHATE 75 MG: 75 CAPSULE ORAL at 22:38

## 2025-03-02 RX ADMIN — OSELTAMIVIR PHOSPHATE 75 MG: 75 CAPSULE ORAL at 08:59

## 2025-03-02 RX ADMIN — DIVALPROEX SODIUM 250 MG: 125 CAPSULE, COATED PELLETS ORAL at 16:47

## 2025-03-02 RX ADMIN — DIVALPROEX SODIUM 250 MG: 125 CAPSULE, COATED PELLETS ORAL at 06:52

## 2025-03-02 RX ADMIN — Medication 1 TABLET: at 22:39

## 2025-03-02 RX ADMIN — ASPIRIN 81 MG: 81 TABLET, COATED ORAL at 11:54

## 2025-03-02 RX ADMIN — HYDROCORTISONE: 25 CREAM TOPICAL at 08:59

## 2025-03-02 RX ADMIN — POLYETHYLENE GLYCOL 3350 17 G: 17 POWDER, FOR SOLUTION ORAL at 08:58

## 2025-03-02 RX ADMIN — HYDROCORTISONE: 25 CREAM TOPICAL at 22:37

## 2025-03-02 RX ADMIN — Medication 2000 UNITS: at 08:58

## 2025-03-02 RX ADMIN — SPIRONOLACTONE 25 MG: 25 TABLET ORAL at 22:49

## 2025-03-02 RX ADMIN — SPIRONOLACTONE 50 MG: 25 TABLET ORAL at 06:52

## 2025-03-02 NOTE — PLAN OF CARE
Problem: Discharge Planning  Goal: Discharge to home or other facility with appropriate resources  3/2/2025 1141 by Sonido Taylor RN  Outcome: Progressing  Flowsheets (Taken 3/2/2025 0845)  Discharge to home or other facility with appropriate resources:   Identify barriers to discharge with patient and caregiver   Arrange for needed discharge resources and transportation as appropriate   Identify discharge learning needs (meds, wound care, etc)  3/2/2025 0310 by Regla Reina RN  Outcome: Progressing  Flowsheets  Taken 3/1/2025 1915 by Regla Reina RN  Discharge to home or other facility with appropriate resources: Identify barriers to discharge with patient and caregiver  Taken 3/1/2025 1530 by Sonido Taylor RN  Discharge to home or other facility with appropriate resources:   Identify barriers to discharge with patient and caregiver   Arrange for needed discharge resources and transportation as appropriate   Identify discharge learning needs (meds, wound care, etc)     Problem: Skin/Tissue Integrity  Goal: Skin integrity remains intact  Description: 1.  Monitor for areas of redness and/or skin breakdown  2.  Assess vascular access sites hourly  3.  Every 4-6 hours minimum:  Change oxygen saturation probe site  4.  Every 4-6 hours:  If on nasal continuous positive airway pressure, respiratory therapy assess nares and determine need for appliance change or resting period  3/2/2025 1141 by Sonido Taylor RN  Outcome: Progressing  Flowsheets (Taken 3/2/2025 0845)  Skin Integrity Remains Intact:   Monitor for areas of redness and/or skin breakdown   Assess vascular access sites hourly  3/2/2025 0310 by Regla Reina RN  Outcome: Progressing  Flowsheets  Taken 3/2/2025 0309  Skin Integrity Remains Intact: Monitor for areas of redness and/or skin breakdown  Taken 3/1/2025 1915  Skin Integrity Remains Intact: Monitor for areas of redness and/or skin breakdown     Problem: Respiratory - Adult  Goal:  Achieves optimal ventilation and oxygenation  3/2/2025 1141 by Sonido Taylor, RN  Outcome: Progressing  Flowsheets (Taken 3/2/2025 0845)  Achieves optimal ventilation and oxygenation:   Assess for changes in respiratory status   Assess for changes in mentation and behavior   Position to facilitate oxygenation and minimize respiratory effort   Oxygen supplementation based on oxygen saturation or arterial blood gases   Encourage broncho-pulmonary hygiene including cough, deep breathe, incentive spirometry   Assess the need for suctioning and aspirate as needed   Respiratory therapy support as indicated  3/2/2025 0310 by Regla Reina, RN  Outcome: Progressing  Flowsheets (Taken 3/1/2025 1915)  Achieves optimal ventilation and oxygenation:   Assess for changes in respiratory status   Assess for changes in mentation and behavior   Position to facilitate oxygenation and minimize respiratory effort     Problem: Safety - Adult  Goal: Free from fall injury  3/2/2025 1141 by Sonido Taylor, RN  Outcome: Progressing  3/2/2025 0310 by Regla Reina, RN  Outcome: Progressing

## 2025-03-02 NOTE — PLAN OF CARE
Problem: Discharge Planning  Goal: Discharge to home or other facility with appropriate resources  Recent Flowsheet Documentation  Taken 3/1/2025 1530 by Sonido Taylor RN  Discharge to home or other facility with appropriate resources:   Identify barriers to discharge with patient and caregiver   Arrange for needed discharge resources and transportation as appropriate   Identify discharge learning needs (meds, wound care, etc)

## 2025-03-02 NOTE — PLAN OF CARE
Problem: Discharge Planning  Goal: Discharge to home or other facility with appropriate resources  Outcome: Progressing  Flowsheets  Taken 3/1/2025 1915 by Regla Reina, RN  Discharge to home or other facility with appropriate resources: Identify barriers to discharge with patient and caregiver  Taken 3/1/2025 1530 by Sonido Taylor, RN  Discharge to home or other facility with appropriate resources:   Identify barriers to discharge with patient and caregiver   Arrange for needed discharge resources and transportation as appropriate   Identify discharge learning needs (meds, wound care, etc)     Problem: Skin/Tissue Integrity  Goal: Skin integrity remains intact  Description: 1.  Monitor for areas of redness and/or skin breakdown  2.  Assess vascular access sites hourly  3.  Every 4-6 hours minimum:  Change oxygen saturation probe site  4.  Every 4-6 hours:  If on nasal continuous positive airway pressure, respiratory therapy assess nares and determine need for appliance change or resting period  Outcome: Progressing  Flowsheets  Taken 3/2/2025 0309  Skin Integrity Remains Intact: Monitor for areas of redness and/or skin breakdown  Taken 3/1/2025 1915  Skin Integrity Remains Intact: Monitor for areas of redness and/or skin breakdown     Problem: Respiratory - Adult  Goal: Achieves optimal ventilation and oxygenation  Outcome: Progressing  Flowsheets (Taken 3/1/2025 1915)  Achieves optimal ventilation and oxygenation:   Assess for changes in respiratory status   Assess for changes in mentation and behavior   Position to facilitate oxygenation and minimize respiratory effort

## 2025-03-02 NOTE — PROGRESS NOTES
Progress Note  Date:3/2/2025       Room:30/0630-  Patient Name:Ivon Lowry     YOB: 1935     Age:89 y.o.        Subjective    Subjective:  Symptoms:  Stable.  She reports shortness of breath, malaise and weakness.    Diet:  Adequate intake.  No nausea or vomiting.    Activity level: Impaired due to weakness.       Review of Systems   Constitutional:  Negative for fever.   Respiratory:  Positive for shortness of breath.    Gastrointestinal:  Negative for nausea and vomiting.   Neurological:  Positive for weakness.   All other systems reviewed and are negative.    Objective         Vitals Last 24 Hours:  TEMPERATURE:  Temp  Av.7 °F (37.1 °C)  Min: 97.5 °F (36.4 °C)  Max: 99.9 °F (37.7 °C)  RESPIRATIONS RANGE: Resp  Av.6  Min: 16  Max: 25  PULSE OXIMETRY RANGE: SpO2  Av %  Min: 94 %  Max: 98 %  PULSE RANGE: Pulse  Av.8  Min: 66  Max: 85  BLOOD PRESSURE RANGE: Systolic (24hrs), Av , Min:114 , Max:150   ; Diastolic (24hrs), Av, Min:51, Max:74    I/O (24Hr):    Intake/Output Summary (Last 24 hours) at 3/2/2025 1117  Last data filed at 3/2/2025 0919  Gross per 24 hour   Intake 360 ml   Output --   Net 360 ml     Objective:  General Appearance:  Ill-appearing.    Vital signs: (most recent): Blood pressure 114/62, pulse 67, temperature 98.1 °F (36.7 °C), temperature source Oral, resp. rate 16, SpO2 94%, not currently breastfeeding.  Vital signs are normal.  No fever.    Output: Producing urine and producing stool.    HEENT: Normal HEENT exam.    Lungs:  Normal effort and normal respiratory rate.  There are decreased breath sounds.    Heart: Normal rate.  Regular rhythm.    Abdomen: Abdomen is soft.  Bowel sounds are normal.   There is no abdominal tenderness.     Extremities: Decreased range of motion.    Neurological: Patient is alert.    Skin:  Warm and dry.      Labs/Imaging/Diagnostics    Labs:  CBC:  Recent Labs     25   WBC 8.5   RBC 4.18   HGB 12.9    HCT 39.2   MCV 93.8   RDW 14.0     CHEMISTRIES:  Recent Labs     02/28/25 2032 03/01/25  0714 03/02/25  0735    136 135   K 4.6 4.3 4.0   CL 98 100 98   CO2 27 25 28   BUN 32* 25* 31*   CREATININE 1.1* 0.8 1.0   GLUCOSE 116* 143* 100*     PT/INR:No results for input(s): \"PROTIME\", \"INR\" in the last 72 hours.  APTT:No results for input(s): \"APTT\" in the last 72 hours.  LIVER PROFILE:  Recent Labs     02/28/25 2032   AST 28   ALT 17   BILITOT 0.4   ALKPHOS 65       Imaging Last 24 Hours:  XR CHEST PORTABLE    Result Date: 2/28/2025  EXAMINATION: ONE XRAY VIEW OF THE CHEST 2/28/2025 8:47 pm COMPARISON: November 6, 2018 HISTORY: ORDERING SYSTEM PROVIDED HISTORY: cough, shortness of breath TECHNOLOGIST PROVIDED HISTORY: Reason for exam:->cough, shortness of breath FINDINGS: No airspace opacity or pleural effusion. The heart is normal size. No pneumothorax. No free air beneath the hemidiaphragms.     No pneumonia or pleural effusion.     Assessment//Plan           Hospital Problems             Last Modified POA    * (Principal) EKG abnormalities 3/1/2025 Yes    Influenza A 3/1/2025 Yes    Elevated troponin 3/1/2025 Yes    IMTIAZ (acute kidney injury) 3/1/2025 Yes     Assessment:    Condition: In stable condition.  Improving.   (On 1 L,  improved,  no cardiac issues).     Plan:   Per physical therapy.  Consults: cardiology, medicine and physical therapy.  Regular diet.   (Continue Oxygen support  Tamiflu  Labs in am  PT).       Electronically signed by Joe Jones DO on 3/2/25 at 11:17 AM EST

## 2025-03-02 NOTE — PROGRESS NOTES
Patient - Ivon Lowry,  Age - 89 y.o.    - 1935      Room Number - 0630/0630-01   MRN -  [id number]   [facility] # - [id number]  Date of Admission -  2025  8:02 PM    Problem list of patient:     Hospital Problems             Last Modified POA    * (Principal) EKG abnormalities 3/1/2025 Yes    Influenza A 3/1/2025 Yes    Elevated troponin 3/1/2025 Yes    IMTIAZ (acute kidney injury) 3/1/2025 Yes     ASSESSMENT/PLAN   Flu a on tamiflu  1L   -sounds course still has no c/o   -flutter   Check resp cx             SUBJECTIVE:   DOS:  25 temp 99.5  cr1     OBJECTIVE   VITALS    oral temperature is 98.1 °F (36.7 °C). Her blood pressure is 114/62 and her pulse is 67. Her respiration is 16 and oxygen saturation is 94%.       General Appearance:   awake and alert   HEENT:    at/nc  Neck:   supple, no mass  Lungs:    Coarse bs ant b   Cardiovascular:  s1/s2  Abdomen:   soft nt /nd  :  Neurologic:   nad no focal deficits   Skin :    no rash   Extremities:   has rom  edema          Peripheral Intravenous Line:  Peripheral IV 25 Right Antecubital (Active)          MEDICATIONS:      hydrocortisone   Rectal BID    metoprolol tartrate  25 mg Oral BID    aspirin  81 mg Oral Daily    oseltamivir  75 mg Oral BID    divalproex  250 mg Oral TID AC    fluticasone  1 spray Each Nostril QHS    therapeutic multivitamin-minerals  1 tablet Oral Daily    polyethylene glycol  17 g Oral Daily    spironolactone  50 mg Oral Daily    spironolactone  25 mg Oral QHS    Vitamin D  2,000 Units Oral Daily       loperamide    LABS:     Recent Labs     25   WBC 8.5   HGB 12.9     Recent Labs     25  0714 25  0735    136 135   K 4.6 4.3 4.0   CL 98 100 98   CO2  28   BUN 32* 25* 31*   CREATININE 1.1* 0.8 1.0   GLUCOSE 116* 143* 100*     Recent Labs     25   ALKPHOS 65   ALT 17   AST 28   BILITOT 0.4  concerns.    Electronically signed by Judy Oh MD on 3/2/2025 at 10:17 AM

## 2025-03-02 NOTE — PROGRESS NOTES
Cardiology  Progress Note      SUBJECTIVE: Patient was flat in bed when I came to see her.  She is confused.  No acute distress    Current Inpatient Medications  Current Facility-Administered Medications: hydrocortisone (ANUSOL-HC) 2.5 % rectal cream, , Rectal, BID  oseltamivir (TAMIFLU) capsule 75 mg, 75 mg, Oral, BID  divalproex (DEPAKOTE SPRINKLE) DR capsule 250 mg, 250 mg, Oral, TID AC  fluticasone (FLONASE) 50 MCG/ACT nasal spray 1 spray, 1 spray, Each Nostril, QHS  loperamide (IMODIUM) capsule 2 mg, 2 mg, Oral, 4x Daily PRN  therapeutic multivitamin-minerals 1 tablet, 1 tablet, Oral, Daily  polyethylene glycol (GLYCOLAX) packet 17 g, 17 g, Oral, Daily  spironolactone (ALDACTONE) tablet 50 mg, 50 mg, Oral, Daily  spironolactone (ALDACTONE) tablet 25 mg, 25 mg, Oral, QHS  Vitamin D (CHOLECALCIFEROL) tablet 2,000 Units, 2,000 Units, Oral, Daily      Physical  VITALS:  /62   Pulse 67   Temp 98.1 °F (36.7 °C) (Oral)   Resp 16   SpO2 94%   CURRENT TEMPERATURE:  Temp: 98.1 °F (36.7 °C)  CONSTITUTIONAL: No acute distress.  EYES: Vision is intact.  ENT: No sore throat.  No ear drainage.  NECK: No JVD.  BACK: Symmetric.  LUNGS:  diminished breath sounds right base and left base  CARDIOVASCULAR:  normal S1 and S2, no S3, and murmurs include systolic murmur I/VI located at right upper sternal border and left sternal border without radiation  ABDOMEN:  non-tender  NEUROLOGIC: No focal deficits.  Confused  EXTREMITIES: Chronic +1 edema both legs.    DATA:      Cardiology Labs:    Lab Results   Component Value Date/Time     03/01/2025 07:14 AM    K 4.3 03/01/2025 07:14 AM     03/01/2025 07:14 AM    CO2 25 03/01/2025 07:14 AM    BUN 25 03/01/2025 07:14 AM    CREATININE 0.8 03/01/2025 07:14 AM    GLUCOSE 143 03/01/2025 07:14 AM    GLUCOSE 94 04/20/2012 10:20 AM    CALCIUM 9.4 03/01/2025 07:14 AM    LABGLOM 67 03/01/2025 07:14 AM      CBC:    Recent Labs     02/28/25 2032   WBC 8.5   HGB 12.9   HCT 39.2    MCV 93.8     TROPONIN:    Lab Results   Component Value Date    CKTOTAL 81 02/28/2025    TROPHS 421 (H) 02/28/2025       ASSESSMENT & PLAN:      Altered mental status  Shortness of breath with cough  Elevated troponin level  Coronary artery disease  Possible sepsis/UTI  Confusion    Patient was brought to the emergency room because of some cough and chest congestion    She was started on antibiotics    From the cardiac standpoint she had elevated troponin level around 525 on admission.  Repeat level came down to 421 consistent with non-STEMI    She has problem with coronary artery disease as I stated above    Patient is confused and somewhat agitated and with her mental status I do not feel she is suitable for any cardiac workup at this point            Electronically signed by Rosy Carcamo MD on 3/2/2025 at 8:59 AM

## 2025-03-02 NOTE — H&P
Department of Family Practice  Attending History and Physical        CHIEF COMPLAINT:  SHORTNESS OF BREATH    Reason for Admission:  ABNORMAL EKG, ELEVATED TROPONIN    History Obtained From:  electronic medical record, ER , reason patient could not give history:  CONFUSED, Quality of history:  good historian    HISTORY OF PRESENT ILLNESS:      The patient is a 89 y.o. female with significant past medical history of CORONARY ARTERY DISEASE who presents with SHORTNESS OF BREATH.      Shortness of Breath       C/O shortness of breath and productive cough for 4 days. Patient from [facility].     Ivon Lowry is a 89 y.o. female who presents to the emergency department for evaluation of shortness of breath.  Patient sister states she had a cough for the last 4 days.  Patient has not any fevers or chills.  Patient has not had nausea or vomiting.  Patient is not complaining of any chest pain.  Patient's history is limited to due to dementia.  THIS IS A PATIENT OF DR. GAUTHIER FOR WHOM I'M COVERING.    SHE HAS ABNORMALITIES ON EKG WITH ELEVATED TROPONIN AND NO PRIOR RECORDS TO COMPARE.    Past Medical History:        Diagnosis Date    Aldosterone-secreting adenoma     f/w Dr Marin - Endocrinology    CAD (coronary artery disease)     f/w Dr Carcamo    Diverticulosis     GERD (gastroesophageal reflux disease)     Osteoarthritis     RSV (respiratory syncytial virus infection) 01/2019    Sleep apnea      Past Surgical History:        Procedure Laterality Date    COLONOSCOPY      EYE SURGERY      HERNIA REPAIR      HYSTERECTOMY (CERVIX STATUS UNKNOWN)      KIDNEY SURGERY      UPPER GASTROINTESTINAL ENDOSCOPY       Immunizations:              Influenza:  Indicated for current flu vaccination season Oct. to Feb.            Pneumococcal Polysaccharide:  Indicated for current flu vaccination season Oct. to Feb.    Medications Prior to Admission:   Medications Prior to Admission: hydrocortisone (ANUSOL-HC) 2.5 % CREA  LEUKOCYTESUR LARGE 08/01/2022 02:46 PM    UROBILINOGEN 0.2 08/01/2022 02:46 PM    BILIRUBINUR Negative 08/01/2022 02:46 PM    BLOODU MODERATE 08/01/2022 02:46 PM    GLUCOSEU Negative 08/01/2022 02:46 PM     ASSESSMENT AND PLAN:      Principal Problem:    EKG abnormalities  Plan: TELEMETRY BED, CARDIOLOGY CONSULT  Active Problems:    IMTIAZ (acute kidney injury)  Plan: DAILY LABS, IV FLUIDS    Influenza A  Plan: INFECTIOUS DISEASE CONSULT    Elevated troponin  Plan: CARDIOLOGY CONSULT (NOT SUITABLE FOR CARDIAC WORKUP)      INPATIENT ADMISSION.  PLANS AS ABOVE.   DISCHARGE PLANS TO RETURN HOME.  ESTIMATED LENGTH OF STAY IS 3 DAYS.

## 2025-03-03 LAB
ANION GAP SERPL CALCULATED.3IONS-SCNC: 9 MMOL/L (ref 7–16)
BUN SERPL-MCNC: 37 MG/DL (ref 6–23)
CALCIUM SERPL-MCNC: 9.6 MG/DL (ref 8.6–10.2)
CHLORIDE SERPL-SCNC: 99 MMOL/L (ref 98–107)
CO2 SERPL-SCNC: 28 MMOL/L (ref 22–29)
CREAT SERPL-MCNC: 0.9 MG/DL (ref 0.5–1)
EKG ATRIAL RATE: 67 BPM
EKG ATRIAL RATE: 89 BPM
EKG P AXIS: 50 DEGREES
EKG P AXIS: 63 DEGREES
EKG P-R INTERVAL: 134 MS
EKG P-R INTERVAL: 140 MS
EKG Q-T INTERVAL: 398 MS
EKG Q-T INTERVAL: 404 MS
EKG QRS DURATION: 94 MS
EKG QRS DURATION: 94 MS
EKG QTC CALCULATION (BAZETT): 426 MS
EKG QTC CALCULATION (BAZETT): 484 MS
EKG R AXIS: -16 DEGREES
EKG R AXIS: -35 DEGREES
EKG T AXIS: 164 DEGREES
EKG T AXIS: 164 DEGREES
EKG VENTRICULAR RATE: 67 BPM
EKG VENTRICULAR RATE: 89 BPM
GFR, ESTIMATED: 62 ML/MIN/1.73M2
GLUCOSE SERPL-MCNC: 89 MG/DL (ref 74–99)
MICROORGANISM/AGENT SPEC: ABNORMAL
POTASSIUM SERPL-SCNC: 4.1 MMOL/L (ref 3.5–5)
SERVICE CMNT-IMP: ABNORMAL
SODIUM SERPL-SCNC: 136 MMOL/L (ref 132–146)
SPECIMEN DESCRIPTION: ABNORMAL

## 2025-03-03 PROCEDURE — 6370000000 HC RX 637 (ALT 250 FOR IP): Performed by: FAMILY MEDICINE

## 2025-03-03 PROCEDURE — 93010 ELECTROCARDIOGRAM REPORT: CPT | Performed by: INTERNAL MEDICINE

## 2025-03-03 PROCEDURE — 80048 BASIC METABOLIC PNL TOTAL CA: CPT

## 2025-03-03 PROCEDURE — 97530 THERAPEUTIC ACTIVITIES: CPT | Performed by: PHYSICAL THERAPIST

## 2025-03-03 PROCEDURE — 97161 PT EVAL LOW COMPLEX 20 MIN: CPT | Performed by: PHYSICAL THERAPIST

## 2025-03-03 PROCEDURE — 6370000000 HC RX 637 (ALT 250 FOR IP): Performed by: SPECIALIST

## 2025-03-03 PROCEDURE — 2060000000 HC ICU INTERMEDIATE R&B

## 2025-03-03 PROCEDURE — 36415 COLL VENOUS BLD VENIPUNCTURE: CPT

## 2025-03-03 RX ORDER — IPRATROPIUM BROMIDE AND ALBUTEROL SULFATE 2.5; .5 MG/3ML; MG/3ML
1 SOLUTION RESPIRATORY (INHALATION) EVERY 4 HOURS PRN
Status: DISCONTINUED | OUTPATIENT
Start: 2025-03-03 | End: 2025-03-05 | Stop reason: HOSPADM

## 2025-03-03 RX ADMIN — HYDROCORTISONE: 25 CREAM TOPICAL at 09:04

## 2025-03-03 RX ADMIN — Medication 1 TABLET: at 20:39

## 2025-03-03 RX ADMIN — DIVALPROEX SODIUM 250 MG: 125 CAPSULE, COATED PELLETS ORAL at 06:05

## 2025-03-03 RX ADMIN — DIVALPROEX SODIUM 250 MG: 125 CAPSULE, COATED PELLETS ORAL at 16:08

## 2025-03-03 RX ADMIN — DIVALPROEX SODIUM 250 MG: 125 CAPSULE, COATED PELLETS ORAL at 11:46

## 2025-03-03 RX ADMIN — SPIRONOLACTONE 25 MG: 25 TABLET ORAL at 20:39

## 2025-03-03 RX ADMIN — Medication 2000 UNITS: at 09:04

## 2025-03-03 RX ADMIN — SPIRONOLACTONE 50 MG: 25 TABLET ORAL at 06:05

## 2025-03-03 RX ADMIN — HYDROCORTISONE: 25 CREAM TOPICAL at 20:39

## 2025-03-03 RX ADMIN — POLYETHYLENE GLYCOL 3350 17 G: 17 POWDER, FOR SOLUTION ORAL at 09:04

## 2025-03-03 RX ADMIN — FLUTICASONE PROPIONATE 1 SPRAY: 50 SPRAY, METERED NASAL at 20:41

## 2025-03-03 RX ADMIN — OSELTAMIVIR PHOSPHATE 75 MG: 75 CAPSULE ORAL at 09:04

## 2025-03-03 RX ADMIN — ASPIRIN 81 MG: 81 TABLET, COATED ORAL at 09:04

## 2025-03-03 RX ADMIN — OSELTAMIVIR PHOSPHATE 75 MG: 75 CAPSULE ORAL at 20:39

## 2025-03-03 ASSESSMENT — ENCOUNTER SYMPTOMS
SHORTNESS OF BREATH: 0
VOMITING: 0
NAUSEA: 0

## 2025-03-03 ASSESSMENT — PAIN SCALES - WONG BAKER: WONGBAKER_NUMERICALRESPONSE: NO HURT

## 2025-03-03 NOTE — PROGRESS NOTES
Cardiology  Progress Note      SUBJECTIVE:  Confusion. No acute distress.     Current Inpatient Medications  Current Facility-Administered Medications: ipratropium 0.5 mg-albuterol 2.5 mg (DUONEB) nebulizer solution 1 Dose, 1 Dose, Inhalation, Q4H PRN  hydrocortisone (ANUSOL-HC) 2.5 % rectal cream, , Rectal, BID  aspirin EC tablet 81 mg, 81 mg, Oral, Daily  metoprolol tartrate (LOPRESSOR) tablet 12.5 mg, 12.5 mg, Oral, BID  oseltamivir (TAMIFLU) capsule 75 mg, 75 mg, Oral, BID  divalproex (DEPAKOTE SPRINKLE) DR capsule 250 mg, 250 mg, Oral, TID AC  fluticasone (FLONASE) 50 MCG/ACT nasal spray 1 spray, 1 spray, Each Nostril, QHS  loperamide (IMODIUM) capsule 2 mg, 2 mg, Oral, 4x Daily PRN  therapeutic multivitamin-minerals 1 tablet, 1 tablet, Oral, Daily  polyethylene glycol (GLYCOLAX) packet 17 g, 17 g, Oral, Daily  spironolactone (ALDACTONE) tablet 50 mg, 50 mg, Oral, Daily  spironolactone (ALDACTONE) tablet 25 mg, 25 mg, Oral, QHS  Vitamin D (CHOLECALCIFEROL) tablet 2,000 Units, 2,000 Units, Oral, Daily      Physical  VITALS:  BP (!) 120/56   Pulse 59   Temp 97.3 °F (36.3 °C)   Resp 18   SpO2 98%   CURRENT TEMPERATURE:  Temp: 97.3 °F (36.3 °C)  CONSTITUTIONAL: No acute distress.  EYES: Vision is intact.  ENT: No sore throat.  No ear drainage.  NECK: No JVD.  BACK: Symmetric.  LUNGS:  diminished breath sounds right base and left base  CARDIOVASCULAR:  normal S1 and S2, no S3, and no S4  ABDOMEN:  non-tender  NEUROLOGIC: No focal deficits.    EXTREMITIES: No edema cyanosis or clubbing.    DATA:        Cardiology Labs:    Lab Results   Component Value Date/Time     03/03/2025 06:39 AM    K 4.1 03/03/2025 06:39 AM    CL 99 03/03/2025 06:39 AM    CO2 28 03/03/2025 06:39 AM    BUN 37 03/03/2025 06:39 AM    CREATININE 0.9 03/03/2025 06:39 AM    GLUCOSE 89 03/03/2025 06:39 AM    GLUCOSE 94 04/20/2012 10:20 AM    CALCIUM 9.6 03/03/2025 06:39 AM    LABGLOM 62 03/03/2025 06:39 AM      CBC:    Recent Labs      02/28/25 2032   WBC 8.5   HGB 12.9   HCT 39.2   MCV 93.8     TROPONIN:    Lab Results   Component Value Date    CKTOTAL 81 02/28/2025    TROPHS 421 (H) 02/28/2025       ASSESSMENT & PLAN:      Altered mental status  Shortness of breath with cough  Elevated troponin level  Coronary artery disease  Possible sepsis/UTI  Confusion    Patient was brought to the emergency room because of some cough and chest congestion    She was started on antibiotics    From the cardiac standpoint she had elevated troponin level around 525 on admission.  Repeat level came down to 421 consistent with non-STEMI    She has problem with coronary artery disease as I stated above    Patient is confused and somewhat agitated and with her mental status I do not feel she is suitable for any cardiac workup at this point              Electronically signed by Rosy Carcamo MD on 3/3/2025 at 8:55 AM

## 2025-03-03 NOTE — PROGRESS NOTES
Patient - Ivon Lowry,  Age - 89 y.o.    - 1935      Room Number - 0630/0630-01   MRN -  [id number]   [facility] # - [id number]  Date of Admission -  2025  8:02 PM    Problem list of patient:     Hospital Problems             Last Modified POA    * (Principal) EKG abnormalities 3/1/2025 Yes    Influenza A 3/1/2025 Yes    Elevated troponin 3/1/2025 Yes    IMTIAZ (acute kidney injury) 3/1/2025 Yes     ASSESSMENT/PLAN   Flu a on tamiflu  1L   -sounds course still has no c/o   -flutter   Check resp cx   Check urine cx has external ash          SUBJECTIVE:   DOS:  25 afebrile 2L  working with pt   3/2  temp 99.5  cr1     OBJECTIVE   VITALS    temperature is 97.7 °F (36.5 °C). Her blood pressure is 127/45 (abnormal) and her pulse is 56. Her respiration is 18 and oxygen saturation is 97%.       General Appearance:   awake and alert   HEENT:    at/nc  Lungs:    Coarse bs ant b o2   Cardiovascular:  s1/s2  Abdomen:   soft nt /nd  :  Neurologic:   nad no focal deficits   Skin :    no rash   Extremities:   has rom  edema          Peripheral Intravenous Line:  Peripheral IV 25 Right Antecubital (Active)          MEDICATIONS:      hydrocortisone   Rectal BID    aspirin  81 mg Oral Daily    metoprolol tartrate  12.5 mg Oral BID    oseltamivir  75 mg Oral BID    divalproex  250 mg Oral TID AC    fluticasone  1 spray Each Nostril QHS    therapeutic multivitamin-minerals  1 tablet Oral Daily    polyethylene glycol  17 g Oral Daily    spironolactone  50 mg Oral Daily    spironolactone  25 mg Oral QHS    Vitamin D  2,000 Units Oral Daily       ipratropium 0.5 mg-albuterol 2.5 mg, loperamide    LABS:     Recent Labs     25  2032   WBC 8.5   HGB 12.9     Recent Labs     25  0714 25  0735 25  0639    135 136   K 4.3 4.0 4.1    98 99   CO2 25 28 28   BUN 25* 31* 37*   CREATININE 0.8 1.0 0.9   GLUCOSE 143*  100* 89     Recent Labs     02/28/25 2032   ALKPHOS 65   ALT 17   AST 28   BILITOT 0.4      Recent Labs     02/28/25 2140   CKTOTAL 81         CULTURES:   UA:   Recent Labs     03/02/25 1730   PHUR 7.0   COLORU Yellow   PROTEINU >=300*   RBCUA 10 TO 20*   WBCUA 0 TO 5   BACTERIA 1+*   NITRU POSITIVE*   GLUCOSEU NEGATIVE   BILIRUBINUR SMALL*   UROBILINOGEN 1.0   KETUA TRACE*     Results       Procedure Component Value Units Date/Time    Culture, Urine [[id number]] Collected: 03/02/25 1730    Order Status: Sent Specimen: Urine Updated: 03/02/25 1742    Culture, Respiratory [[id number]]  (Abnormal) Collected: 03/02/25 1730    Order Status: Completed Specimen: Sputum Expectorated Updated: 03/03/25 1238     Specimen Description .EXPECTORATED SPUTUM     Special Requests Site: Sputum     Direct Exam The sputum Gram stain indicates that the specimen is not representative of lower respiratory secretions. The culture has been cancelled. Please recollect.    Culture, Urine [[id number]]     Order Status: Canceled Specimen: Urine, clean catch     Culture, Urine [[id number]] Collected: 03/01/25 1741    Order Status: Canceled Specimen: Urine, clean catch     COVID-19 & Influenza Combo [[id number]]  (Abnormal) Collected: 02/28/25 2032    Order Status: Completed Specimen: Nasopharyngeal Swab Updated: 02/28/25 2117     Specimen Description .NASOPHARYNGEAL SWAB     Source .NASOPHARYNGEAL SWAB     SARS-CoV-2 RNA, RT PCR Not Detected     Comment:       Testing was performed using [employer] [facility] SARS-CoV-2 and Influenza A/B nucleic acid assay.  This test is a multiplex Real-Time Reverse Transcriptase Polymerase Chain Reaction   (RT-PCR)-based in vitro diagnostic test intended for the qualitative detection of nucleic   acids from SARS-CoV-2,  influenza A, and influenza B in nasopharyngeal and nasal swab specimens for use under the   FDA's Emergency Use Authorization (EUA) only.        Not Detected results do not preclude SARS-CoV-2  infection and should not be used as the sole   basis for patient management decisions.  Negative results must be combined with clinical observations, patient history, and   epidemiological information.        Fact sheet for Patients: https://www.fda.gov/media/162186/download  Fact sheet for Healthcare Providers: https://www.fda.gov/media/799540/download          Influenza A DETECTED     Influenza B Not Detected            IMAGING:     XR CHEST PORTABLE    Result Date: 2/28/2025  No pneumonia or pleural effusion.     XR CHEST PORTABLE    Result Date: 2/28/2025  No pneumonia or pleural effusion.         Please call [phone]  for any questions or concerns.    Electronically signed by Judy Oh MD on 3/3/2025 at 4:31 PM

## 2025-03-03 NOTE — PROGRESS NOTES
Progress Note  Date:3/3/2025       Room:30/0630-01  Patient Name:Ivon Lowry     YOB: 1935     Age:89 y.o.        Subjective    Subjective:  Symptoms:  Stable.  She reports malaise and weakness.  No shortness of breath.    Diet:  Adequate intake.  No nausea or vomiting.    Activity level: Impaired due to weakness.    Pain:  She reports no pain.       Review of Systems   Constitutional:  Negative for fever.   Respiratory:  Negative for shortness of breath.    Gastrointestinal:  Negative for nausea and vomiting.   Neurological:  Positive for weakness.   All other systems reviewed and are negative.    Objective         Vitals Last 24 Hours:  TEMPERATURE:  Temp  Av.1 °F (36.7 °C)  Min: 97.3 °F (36.3 °C)  Max: 98.6 °F (37 °C)  RESPIRATIONS RANGE: Resp  Av.1  Min: 12  Max: 19  PULSE OXIMETRY RANGE: SpO2  Av.3 %  Min: 94 %  Max: 98 %  PULSE RANGE: Pulse  Av.1  Min: 46  Max: 67  BLOOD PRESSURE RANGE: Systolic (24hrs), Av , Min:106 , Max:121   ; Diastolic (24hrs), Av, Min:51, Max:94    I/O (24Hr):    Intake/Output Summary (Last 24 hours) at 3/3/2025 0842  Last data filed at 3/2/2025 0919  Gross per 24 hour   Intake 360 ml   Output --   Net 360 ml     Objective:  General Appearance:  Ill-appearing.    Vital signs: (most recent): Blood pressure (!) 120/56, pulse 59, temperature 97.3 °F (36.3 °C), resp. rate 18, SpO2 98%, not currently breastfeeding.  Vital signs are normal.  No fever.    Output: Producing urine and producing stool.    HEENT: Normal HEENT exam.    Lungs:  Normal effort and normal respiratory rate.  There are decreased breath sounds.    Heart: Normal rate.  Regular rhythm.    Abdomen: Abdomen is soft.  Bowel sounds are normal.   There is no abdominal tenderness.     Extremities: Decreased range of motion.    Pulses: Distal pulses are intact.    Neurological: Patient is alert.      Labs/Imaging/Diagnostics    Labs:  CBC:  Recent Labs     25   WBC

## 2025-03-03 NOTE — PLAN OF CARE
Problem: Discharge Planning  Goal: Discharge to home or other facility with appropriate resources  3/3/2025 1021 by Sheila Mackey RN  Outcome: Progressing  3/2/2025 2356 by Regla Reina RN  Outcome: Progressing  Flowsheets (Taken 3/2/2025 1910)  Discharge to home or other facility with appropriate resources: Identify barriers to discharge with patient and caregiver     Problem: Skin/Tissue Integrity  Goal: Skin integrity remains intact  Description: 1.  Monitor for areas of redness and/or skin breakdown  2.  Assess vascular access sites hourly  3.  Every 4-6 hours minimum:  Change oxygen saturation probe site  4.  Every 4-6 hours:  If on nasal continuous positive airway pressure, respiratory therapy assess nares and determine need for appliance change or resting period  3/3/2025 1021 by Sheila Mackey RN  Outcome: Progressing  3/2/2025 2356 by Regla Reina RN  Outcome: Progressing  Flowsheets (Taken 3/2/2025 1910)  Skin Integrity Remains Intact: Monitor for areas of redness and/or skin breakdown     Problem: Respiratory - Adult  Goal: Achieves optimal ventilation and oxygenation  3/3/2025 1021 by Sheila Mackey RN  Outcome: Progressing  3/2/2025 2356 by Regla Reina RN  Outcome: Progressing  Flowsheets  Taken 3/2/2025 2352  Achieves optimal ventilation and oxygenation: Assess for changes in respiratory status  Taken 3/2/2025 1910  Achieves optimal ventilation and oxygenation:   Assess for changes in respiratory status   Assess for changes in mentation and behavior   Position to facilitate oxygenation and minimize respiratory effort   Oxygen supplementation based on oxygen saturation or arterial blood gases     Problem: Safety - Adult  Goal: Free from fall injury  Outcome: Progressing

## 2025-03-03 NOTE — PROGRESS NOTES
Spiritual Health History and Assessment/Progress Note  [facility]    Initial Encounter, Spiritual/Emotional Needs,  ,  ,      Name: Ivon Lowry MRN: [id number]    Age: 89 y.o.     Sex: female   Language: English   Restorationist: Quaker   EKG abnormalities     Date: 3/3/2025                           Spiritual Assessment began in [facility]        Referral/Consult From: Rounding   Encounter Overview/Reason: Initial Encounter, Spiritual/Emotional Needs  Service Provided For: Patient    Jailene, Belief, Meaning:   Patient has beliefs or practices that help with coping during difficult times  Family/Friends No family/friends present      Importance and Influence:  Patient has spiritual/personal beliefs that influence decisions regarding their health  Family/Friends No family/friends present    Community:  Patient feels well-supported. Support system includes: Unknown  Family/Friends No family/friends present    Assessment and Plan of Care:     Patient Interventions include: Engaged in theological reflection  Family/Friends Interventions include: No family/friends present    Patient Plan of Care: Spiritual Care available upon further referral  Family/Friends Plan of Care: No family/friends present    Electronically signed by Chaplain Diamante on 3/3/2025 at 1:55 PM

## 2025-03-03 NOTE — PROGRESS NOTES
Physical Therapy  Physical Therapy Initial Evaluation/Plan of Care    Room #:  0630/0630-01  Patient Name: Ivon Lowry  YOB: 1935  MRN: [id number]    Date of Service: 3/3/2025     Tentative placement recommendation: Subacute Rehab  Equipment recommendation: To be determined      Evaluating Physical Therapist: Omer Solorzano, PT, DPT #[id number]    2  Specific Provider Orders/Date/Referring Provider :     03/03/25 0845    PT eval and treat  Start:  03/03/25 0845,   End:  03/03/25 0845,   ONE TIME,   Standing Count:  1 Occurrences,   R       Robert, Joe F, DO Acknowledge New      Admitting Diagnosis:   Influenza [J11.1]  EKG abnormalities [R94.31]  Elevated troponin [R79.89]      Surgery: none  Visit Diagnoses         Codes    Influenza    -  Primary J11.1            Patient Active Problem List   Diagnosis    Hypokalemia    Acute respiratory failure with hypoxemia (HCC)    Aldosterone-secreting adenoma    CAD (coronary artery disease)    RSV (respiratory syncytial virus infection)    Pneumonitis    Chronic pain syndrome    Lumbar facet arthropathy    Lumbar radiculopathy    Lumbar disc disorder    Spinal stenosis of lumbar region without neurogenic claudication    Closed compression fracture of thoracic vertebra (HCC)    Thoracic radiculopathy    Cat bite of left lower leg    EKG abnormalities    Influenza A    Elevated troponin    IMTIAZ (acute kidney injury)        ASSESSMENT of Current Deficits Patient exhibits decreased strength, balance, and endurance impairing functional mobility, transfers, gait , gait distance, and tolerance to activity. Pt very confused during session and oriented to self only and able to perform function with Min/ModA with HHA with decreased tolerance for function.        PHYSICAL THERAPY  PLAN OF CARE       Physical therapy plan of care is established based on physician order,  patient diagnosis and clinical assessment    Current Treatment Recommendations:    -Bed Mobility:  session, patient sitting edge of bed with alarm call light and phone within reach,  all lines and tubes intact, nursing notified.      Patient would benefit from continued skilled Physical Therapy to improve functional independence and quality of life.         Patient's/ family goals   none stated    Time in    1632  Time out  1702    Total Treatment Time  10 minutes    Evaluation time includes thorough review of current medical information, gathering information on past medical history/social history and prior level of function, completion of standardized testing/informal observation of tasks, assessment of data, and development of Plan of care and goals.     CPT codes:  Low Complexity PT evaluation (77289)  Therapeutic activities (60074)   10 minutes  1 unit(s)    Omer Solorzano, PT

## 2025-03-03 NOTE — PLAN OF CARE
Problem: Discharge Planning  Goal: Discharge to home or other facility with appropriate resources  3/2/2025 2356 by Regla Reina, RN  Outcome: Progressing  Flowsheets (Taken 3/2/2025 1910)  Discharge to home or other facility with appropriate resources: Identify barriers to discharge with patient and caregiver  3/2/2025 1141 by Sonido Taylor RN  Outcome: Progressing  Flowsheets (Taken 3/2/2025 0845)  Discharge to home or other facility with appropriate resources:   Identify barriers to discharge with patient and caregiver   Arrange for needed discharge resources and transportation as appropriate   Identify discharge learning needs (meds, wound care, etc)     Problem: Skin/Tissue Integrity  Goal: Skin integrity remains intact  Description: 1.  Monitor for areas of redness and/or skin breakdown  2.  Assess vascular access sites hourly  3.  Every 4-6 hours minimum:  Change oxygen saturation probe site  4.  Every 4-6 hours:  If on nasal continuous positive airway pressure, respiratory therapy assess nares and determine need for appliance change or resting period  3/2/2025 2356 by Regla Reina RN  Outcome: Progressing  Flowsheets (Taken 3/2/2025 1910)  Skin Integrity Remains Intact: Monitor for areas of redness and/or skin breakdown  3/2/2025 1141 by Sonido Taylor RN  Outcome: Progressing  Flowsheets (Taken 3/2/2025 0845)  Skin Integrity Remains Intact:   Monitor for areas of redness and/or skin breakdown   Assess vascular access sites hourly     Problem: Respiratory - Adult  Goal: Achieves optimal ventilation and oxygenation  3/2/2025 2356 by Regla Reina, RN  Outcome: Progressing  Flowsheets  Taken 3/2/2025 2352  Achieves optimal ventilation and oxygenation: Assess for changes in respiratory status  Taken 3/2/2025 1910  Achieves optimal ventilation and oxygenation:   Assess for changes in respiratory status   Assess for changes in mentation and behavior   Position to facilitate oxygenation and  minimize respiratory effort   Oxygen supplementation based on oxygen saturation or arterial blood gases  3/2/2025 1141 by Sonido Taylor, RN  Outcome: Progressing  Flowsheets (Taken 3/2/2025 0822)  Achieves optimal ventilation and oxygenation:   Assess for changes in respiratory status   Assess for changes in mentation and behavior   Position to facilitate oxygenation and minimize respiratory effort   Oxygen supplementation based on oxygen saturation or arterial blood gases   Encourage broncho-pulmonary hygiene including cough, deep breathe, incentive spirometry   Assess the need for suctioning and aspirate as needed   Respiratory therapy support as indicated

## 2025-03-04 LAB
BACTERIA URNS QL MICRO: ABNORMAL
BILIRUB UR QL STRIP: NEGATIVE
CLARITY UR: ABNORMAL
COLOR UR: YELLOW
EPI CELLS #/AREA URNS HPF: ABNORMAL /HPF
GLUCOSE UR STRIP-MCNC: NEGATIVE MG/DL
HGB UR QL STRIP.AUTO: ABNORMAL
KETONES UR STRIP-MCNC: NEGATIVE MG/DL
LEUKOCYTE ESTERASE UR QL STRIP: NEGATIVE
MICROORGANISM SPEC CULT: ABNORMAL
MICROORGANISM SPEC CULT: ABNORMAL
NITRITE UR QL STRIP: NEGATIVE
PH UR STRIP: 6 [PH] (ref 5–8)
PROT UR STRIP-MCNC: ABNORMAL MG/DL
RBC #/AREA URNS HPF: ABNORMAL /HPF
SERVICE CMNT-IMP: ABNORMAL
SP GR UR STRIP: 1.02 (ref 1–1.03)
SPECIMEN DESCRIPTION: ABNORMAL
TROPONIN I SERPL HS-MCNC: 144 NG/L (ref 0–9)
UROBILINOGEN UR STRIP-ACNC: 0.2 EU/DL (ref 0–1)
WBC #/AREA URNS HPF: ABNORMAL /HPF

## 2025-03-04 PROCEDURE — 2700000000 HC OXYGEN THERAPY PER DAY

## 2025-03-04 PROCEDURE — 6360000002 HC RX W HCPCS: Performed by: SPECIALIST

## 2025-03-04 PROCEDURE — 87086 URINE CULTURE/COLONY COUNT: CPT

## 2025-03-04 PROCEDURE — 51702 INSERT TEMP BLADDER CATH: CPT

## 2025-03-04 PROCEDURE — 94640 AIRWAY INHALATION TREATMENT: CPT

## 2025-03-04 PROCEDURE — 84484 ASSAY OF TROPONIN QUANT: CPT

## 2025-03-04 PROCEDURE — 6370000000 HC RX 637 (ALT 250 FOR IP): Performed by: FAMILY MEDICINE

## 2025-03-04 PROCEDURE — 36415 COLL VENOUS BLD VENIPUNCTURE: CPT

## 2025-03-04 PROCEDURE — 2060000000 HC ICU INTERMEDIATE R&B

## 2025-03-04 PROCEDURE — 81001 URINALYSIS AUTO W/SCOPE: CPT

## 2025-03-04 PROCEDURE — 2580000003 HC RX 258: Performed by: SPECIALIST

## 2025-03-04 PROCEDURE — 6370000000 HC RX 637 (ALT 250 FOR IP): Performed by: SPECIALIST

## 2025-03-04 PROCEDURE — 51798 US URINE CAPACITY MEASURE: CPT

## 2025-03-04 RX ORDER — ASPIRIN 81 MG/1
81 TABLET ORAL DAILY
Qty: 30 TABLET | Refills: 3 | Status: SHIPPED | OUTPATIENT
Start: 2025-03-04

## 2025-03-04 RX ORDER — METOPROLOL TARTRATE 25 MG/1
12.5 TABLET, FILM COATED ORAL 2 TIMES DAILY
Qty: 60 TABLET | Refills: 3 | Status: SHIPPED | OUTPATIENT
Start: 2025-03-04

## 2025-03-04 RX ORDER — IPRATROPIUM BROMIDE AND ALBUTEROL SULFATE 2.5; .5 MG/3ML; MG/3ML
3 SOLUTION RESPIRATORY (INHALATION) EVERY 4 HOURS PRN
Qty: 360 ML | Refills: 5 | Status: SHIPPED | OUTPATIENT
Start: 2025-03-04

## 2025-03-04 RX ADMIN — DIVALPROEX SODIUM 250 MG: 125 CAPSULE, COATED PELLETS ORAL at 11:28

## 2025-03-04 RX ADMIN — DIVALPROEX SODIUM 250 MG: 125 CAPSULE, COATED PELLETS ORAL at 17:22

## 2025-03-04 RX ADMIN — OSELTAMIVIR PHOSPHATE 75 MG: 75 CAPSULE ORAL at 08:50

## 2025-03-04 RX ADMIN — FLUTICASONE PROPIONATE 1 SPRAY: 50 SPRAY, METERED NASAL at 19:47

## 2025-03-04 RX ADMIN — DIVALPROEX SODIUM 250 MG: 125 CAPSULE, COATED PELLETS ORAL at 05:54

## 2025-03-04 RX ADMIN — VANCOMYCIN HYDROCHLORIDE 1000 MG: 1 INJECTION, POWDER, LYOPHILIZED, FOR SOLUTION INTRAVENOUS at 20:05

## 2025-03-04 RX ADMIN — HYDROCORTISONE: 25 CREAM TOPICAL at 08:51

## 2025-03-04 RX ADMIN — SPIRONOLACTONE 50 MG: 25 TABLET ORAL at 05:54

## 2025-03-04 RX ADMIN — SPIRONOLACTONE 25 MG: 25 TABLET ORAL at 19:47

## 2025-03-04 RX ADMIN — POLYETHYLENE GLYCOL 3350 17 G: 17 POWDER, FOR SOLUTION ORAL at 08:51

## 2025-03-04 RX ADMIN — HYDROCORTISONE: 25 CREAM TOPICAL at 19:47

## 2025-03-04 RX ADMIN — ASPIRIN 81 MG: 81 TABLET, COATED ORAL at 08:51

## 2025-03-04 RX ADMIN — IPRATROPIUM BROMIDE AND ALBUTEROL SULFATE 1 DOSE: 2.5; .5 SOLUTION RESPIRATORY (INHALATION) at 06:10

## 2025-03-04 RX ADMIN — Medication 2000 UNITS: at 08:51

## 2025-03-04 RX ADMIN — CEFEPIME 2000 MG: 2 INJECTION, POWDER, FOR SOLUTION INTRAVENOUS at 19:25

## 2025-03-04 RX ADMIN — Medication 1 TABLET: at 21:32

## 2025-03-04 RX ADMIN — OSELTAMIVIR PHOSPHATE 75 MG: 75 CAPSULE ORAL at 21:33

## 2025-03-04 ASSESSMENT — ENCOUNTER SYMPTOMS
NAUSEA: 0
VOMITING: 0

## 2025-03-04 NOTE — ACP (ADVANCE CARE PLANNING)
Advance Care Planning   Healthcare Decision Maker:    Primary Decision Maker: Eh Lowry - Child - [phone]    Click here to complete Healthcare Decision Makers including selection of the Healthcare Decision Maker Relationship (ie \"Primary\").  Today we documented Decision Maker(s) consistent with Legal Next of Kin hierarchy.

## 2025-03-04 NOTE — PROGRESS NOTES
Patient - Ivon Lowry,  Age - 89 y.o.    - 1935      Room Number - 0630/0630-01   MRN -  [id number]   [facility] # - [id number]  Date of Admission -  2025  8:02 PM    Problem list of patient:     Hospital Problems             Last Modified POA    * (Principal) EKG abnormalities 3/1/2025 Yes    Influenza A 3/1/2025 Yes    Elevated troponin 3/1/2025 Yes    IMTIAZ (acute kidney injury) 3/1/2025 Yes     ASSESSMENT/PLAN   Flu a on tamiflu  1L   -sounds course still has no c/o   -flutter   Check resp cx   Check urine cx has external ash      More confusion ? Uti    Check bladder scan    Vanco x1 cefepime x1   Spoke with staff    Please note that 30 minutes were spent on this case in regards to the intensity and complexity inherent to hospital inpatient or observation care associated with a confirmed or suspected infectious disease.  This included education to patient/representative and/for hospital staff in regards to disease transmission risk assessment and mitigation, public health investigation, analysis and testing, and complex antimicrobial therapy counseling and treatment.           SUBJECTIVE:   DOS:  25 has telelsitter confusion  aggressive   3/3 afebrile 2L  working with pt   3/  temp 99.5  cr1     OBJECTIVE   VITALS    axillary temperature is 98.4 °F (36.9 °C). Her blood pressure is 126/55 (abnormal) and her pulse is 55. Her respiration is 18 and oxygen saturation is 98%.       General Appearance:   awake and alert   HEENT:    at/nc  Lungs:    Coarse bs ant b o2 hoarse   Cardiovascular:  s1/s2  Abdomen:   soft nt /nd  :  Neurologic:   nad no focal deficits   Skin :    no rash   Extremities:   has rom  edema          Peripheral Intravenous Line:  Peripheral IV 25 Right Antecubital (Active)          MEDICATIONS:      hydrocortisone   Rectal BID    aspirin  81 mg Oral Daily    metoprolol tartrate  12.5 mg Oral BID     oseltamivir  75 mg Oral BID    divalproex  250 mg Oral TID AC    fluticasone  1 spray Each Nostril QHS    therapeutic multivitamin-minerals  1 tablet Oral Daily    polyethylene glycol  17 g Oral Daily    spironolactone  50 mg Oral Daily    spironolactone  25 mg Oral QHS    Vitamin D  2,000 Units Oral Daily       ipratropium 0.5 mg-albuterol 2.5 mg, loperamide    LABS:     No results for input(s): \"WBC\", \"HGB\", \"PLT\" in the last 72 hours.    Recent Labs     03/02/25  0735 03/03/25  0639    136   K 4.0 4.1   CL 98 99   CO2 28 28   BUN 31* 37*   CREATININE 1.0 0.9   GLUCOSE 100* 89     No results for input(s): \"ALKPHOS\", \"ALT\", \"AST\", \"BILITOT\", \"BILIDIR\", \"LABALBU\" in the last 72 hours.    Invalid input(s): \"PROT\"     No results for input(s): \"CKTOTAL\", \"CKMB\", \"TROPONINI\" in the last 72 hours.        CULTURES:   UA:   Recent Labs     03/02/25 1730   PHUR 7.0   COLORU Yellow   PROTEINU >=300*   RBCUA 10 TO 20*   WBCUA 0 TO 5   BACTERIA 1+*   NITRU POSITIVE*   GLUCOSEU NEGATIVE   BILIRUBINUR SMALL*   UROBILINOGEN 1.0   KETUA TRACE*     Results       Procedure Component Value Units Date/Time    Culture, Urine [[id number]]  (Abnormal) Collected: 03/02/25 1730    Order Status: Completed Specimen: Urine Updated: 03/04/25 1043     Specimen Description .URINE     Special Requests Site: Urine     Culture Mixed loreto isolated. Further workup and sensitivity testing not routinely indicated and will not be perfomed. Mixed loreto isolated includes:      MIXED GRAM POSITIVE AND GRAM NEGATIVE ORGANISMS    Culture, Respiratory [[id number]]  (Abnormal) Collected: 03/02/25 1730    Order Status: Completed Specimen: Sputum Expectorated Updated: 03/03/25 1238     Specimen Description .EXPECTORATED SPUTUM     Special Requests Site: Sputum     Direct Exam The sputum Gram stain indicates that the specimen is not representative of lower respiratory secretions. The culture has been cancelled. Please recollect.    Culture, Urine

## 2025-03-04 NOTE — PROGRESS NOTES
Progress Note  Date:3/4/2025       Room:0630/0630-01  Patient Name:Ivon oLwry     YOB: 1935     Age:89 y.o.        Subjective    Subjective:  Symptoms:  Stable.  She reports malaise and weakness.    Diet:  Adequate intake.  No nausea or vomiting.    Activity level: Impaired due to weakness.    Pain:  She reports no pain.       Review of Systems   Constitutional:  Negative for fever.   Gastrointestinal:  Negative for nausea and vomiting.   Neurological:  Positive for weakness.   All other systems reviewed and are negative.    Objective         Vitals Last 24 Hours:  TEMPERATURE:  Temp  Av.9 °F (36.6 °C)  Min: 97.4 °F (36.3 °C)  Max: 98.4 °F (36.9 °C)  RESPIRATIONS RANGE: Resp  Av  Min: 18  Max: 18  PULSE OXIMETRY RANGE: SpO2  Av.2 %  Min: 97 %  Max: 98 %  PULSE RANGE: Pulse  Av.3  Min: 53  Max: 59  BLOOD PRESSURE RANGE: Systolic (24hrs), Av , Min:113 , Max:146   ; Diastolic (24hrs), Av, Min:45, Max:72    I/O (24Hr):    Intake/Output Summary (Last 24 hours) at 3/4/2025 0850  Last data filed at 3/4/2025 0601  Gross per 24 hour   Intake 320 ml   Output 300 ml   Net 20 ml     Objective:  General Appearance:  Ill-appearing.    Vital signs: (most recent): Blood pressure (!) 126/55, pulse 55, temperature 98.4 °F (36.9 °C), temperature source Axillary, resp. rate 18, SpO2 98%, not currently breastfeeding.  Vital signs are normal.  No fever.    Output: Producing urine and producing stool.    HEENT: Normal HEENT exam.    Lungs:  Normal effort and normal respiratory rate.  There are decreased breath sounds.    Heart: Normal rate.  Regular rhythm.    Abdomen: Abdomen is soft.  Bowel sounds are normal.   There is no abdominal tenderness.     Extremities: Decreased range of motion.    Neurological: Patient is alert.      Labs/Imaging/Diagnostics    Labs:  CBC:No results for input(s): \"WBC\", \"RBC\", \"HGB\", \"HCT\", \"MCV\", \"RDW\", \"PLT\" in the last 72 hours.  CHEMISTRIES:  Recent Labs      03/02/25  0735 03/03/25  0639    136   K 4.0 4.1   CL 98 99   CO2 28 28   BUN 31* 37*   CREATININE 1.0 0.9   GLUCOSE 100* 89     PT/INR:No results for input(s): \"PROTIME\", \"INR\" in the last 72 hours.  APTT:No results for input(s): \"APTT\" in the last 72 hours.  LIVER PROFILE:No results for input(s): \"AST\", \"ALT\", \"BILIDIR\", \"BILITOT\", \"ALKPHOS\" in the last 72 hours.    Imaging Last 24 Hours:  No results found.  Assessment//Plan           Hospital Problems             Last Modified POA    * (Principal) EKG abnormalities 3/1/2025 Yes    Influenza A 3/1/2025 Yes    Elevated troponin 3/1/2025 Yes    IMTIAZ (acute kidney injury) 3/1/2025 Yes   + RSV  Assessment:    Condition: In stable condition.  Improving.   (On 1 L,  , improved overall, still weak,  urine culture pending, + RSV).     Plan:   Per physical therapy.  Consults: medicine and physical therapy.   (Continue current management  Discharge planning).       Electronically signed by Joe Jones DO on 3/4/25 at 8:50 AM EST

## 2025-03-04 NOTE — CARE COORDINATION
Case Management Assessment  Initial Evaluation    Date/Time of Evaluation: 3/4/2025 3:04 PM  Assessment Completed by: JOSIAS Elizabeth    If patient is discharged prior to next notation, then this note serves as note for discharge by case management.    Patient Name: Ivon Lowry                   YOB: 1935  Diagnosis: Influenza [J11.1]  EKG abnormalities [R94.31]  Elevated troponin [R79.89]                   Date / Time: 2/28/2025  8:02 PM    Patient Admission Status: Inpatient   Readmission Risk (Low < 19, Mod (19-27), High > 27): Readmission Risk Score: 14.5    Current PCP: Joe Jones, DO  PCP verified by CM? Yes    Chart Reviewed: Yes      History Provided by: Medical Record, Other (see comment), Child/Family (facility rep, Marine)  Patient Orientation: Alert and Oriented, Person, Place    Patient Cognition: Short Term Memory Deficit    Hospitalization in the last 30 days (Readmission):  No    If yes, Readmission Assessment in  Navigator will be completed.    Advance Directives:      Code Status: DNR-CCA   Patient's Primary Decision Maker is: Legal Next of Kin    Primary Decision Maker: Eh Lowry - Child - [phone]    Discharge Planning:    Patient lives with:   Type of Home:    Primary Care Giver: Other (Comment) ([facility])  Patient Support Systems include: Children, Other (Comment) ([facility] staff)   Current Financial resources:    Current community resources:    Current services prior to admission:              Current DME:              Type of Home Care services:       ADLS  Prior functional level: Assistance with the following:, Bathing, Dressing, Cooking, Housework, Shopping  Current functional level: Shopping, Housework, Cooking, Dressing, Bathing, Assistance with the following:, Toileting, Mobility    PT AM-PAC: 15 /24  OT AM-PAC:   /24    Family can provide assistance at DC: No  Would you like Case Management to discuss the discharge plan with any other family  members/significant others, and if so, who? Yes (son)  Plans to Return to Present Housing: No  Other Identified Issues/Barriers to RETURNING to current housing: may need rehab prior to returning to [facility]   Potential Assistance needed at discharge:              Potential DME:    Patient expects to discharge to:    Plan for transportation at discharge:      Financial    Payor: MEDICARE / Plan: MEDICARE PART A AND B / Product Type: *No Product type* /     Does insurance require precert for SNF: No    Potential assistance Purchasing Medications:    Meds-to-Beds request: Yes      [id number] - [place], [place] - [address] - P [phone] - F [phone]  [facility] [facility] [address]  Phone: [phone] Fax: [phone]    [id number] - [place], [place] - [address] [place] [phone] - F [phone]  [address] [address]  Phone: [phone] Fax: [phone]    [id number] [place], [place] - [address] - P [phone] - F [phone]  [address]  [facility] [address]  Phone: [phone] Fax: [phone]      Notes:    Factors facilitating achievement of predicted outcomes: Family support, Cooperative, and Pleasant    Barriers to discharge: Decreased endurance and Lower extremity weakness    Additional Case Management Notes: Pt admitted from [facility]. Per Dinora Hawthorne pt was ambulatory with cane independently, set up for dressing, min A for batheing. She has word finding difficulties and unable to appropriately answer questions. PT rec BARBY and discussed with Dinora Hawthorne. They would like pt to go to rehab before return to [facility]. Discussed with sonEh via phone and he is agreeable. Bed is available and NO PRECERT is needed.  OT needs ordered. TYRESE will need signed by physician. HENs form will need completed prior to dc.               JOSIAS Elizabeth  Case Management Department  Ph:  Fax:

## 2025-03-04 NOTE — PLAN OF CARE
Problem: Discharge Planning  Goal: Discharge to home or other facility with appropriate resources  3/4/2025 1032 by Sheila Mackey RN  Outcome: Progressing  3/3/2025 2222 by Cristin Martin RN  Outcome: Progressing     Problem: Skin/Tissue Integrity  Goal: Skin integrity remains intact  Description: 1.  Monitor for areas of redness and/or skin breakdown  2.  Assess vascular access sites hourly  3.  Every 4-6 hours minimum:  Change oxygen saturation probe site  4.  Every 4-6 hours:  If on nasal continuous positive airway pressure, respiratory therapy assess nares and determine need for appliance change or resting period  3/4/2025 1032 by Sheila Mackey RN  Outcome: Progressing  3/3/2025 2222 by Cristin Martin RN  Outcome: Progressing     Problem: Respiratory - Adult  Goal: Achieves optimal ventilation and oxygenation  3/4/2025 1032 by Sheila Mackey RN  Outcome: Progressing  3/3/2025 2222 by Cristin Martin RN  Outcome: Progressing     Problem: Safety - Adult  Goal: Free from fall injury  3/4/2025 1032 by Sheila Mackey RN  Outcome: Progressing  3/3/2025 2222 by Cristin Martin RN  Outcome: Progressing     Problem: Pain  Goal: Verbalizes/displays adequate comfort level or baseline comfort level  3/4/2025 1032 by Sheila Mackey RN  Outcome: Progressing  3/3/2025 2222 by Cristin Martin, RN  Outcome: Progressing

## 2025-03-04 NOTE — DISCHARGE INSTR - COC
Continuity of Care Form    Patient Name: Ivon Lowry   :  1935  MRN:  [id number]    Admit date:  2025  Discharge date:  3/5/2025    Code Status Order: DNR-CCA   Advance Directives:   Advance Care Flowsheet Documentation             Admitting Physician:  Brenda Carrasco DO  PCP: Joe Jones DO    Discharging Nurse: Sheila Mackey RN  Discharging Hospital Unit/Room#: 0630/0630-01  Discharging Unit Phone Number: [phone]    Emergency Contact:   Extended Emergency Contact Information  Primary Emergency Contact: Eh Lowry  Address: [facility]            [address]  Home Phone: [phone]  Work Phone: [phone]  Mobile Phone: [phone]  Relation: Child   needed? No  Secondary Emergency Contact: Mel Lowry  Home Phone: [phone]  Mobile Phone: [phone]  Relation: Other   needed? No    Past Surgical History:  Past Surgical History:   Procedure Laterality Date    COLONOSCOPY      EYE SURGERY      HERNIA REPAIR      HYSTERECTOMY (CERVIX STATUS UNKNOWN)      KIDNEY SURGERY      UPPER GASTROINTESTINAL ENDOSCOPY         Immunization History:   Immunization History   Administered Date(s) Administered    Influenza, FLUARIX, FLULAVAL, FLUZONE (age 6 mo+) and AFLURIA, (age 3 y+), Quadv PF, 0.5mL 2018    Rabies 2024, 2024, 2024, 2024    TDaP, ADACEL (age 10y-64y), BOOSTRIX (age 10y+), IM, 0.5mL 2024       Active Problems:  Patient Active Problem List   Diagnosis Code    Hypokalemia E87.6    Acute respiratory failure with hypoxemia (HCC) J96.01    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    CAD (coronary artery disease) I25.10    RSV (respiratory syncytial virus infection) B33.8    Pneumonitis J98.4    Chronic pain syndrome G89.4    Lumbar facet arthropathy M47.816    Lumbar radiculopathy M54.16    Lumbar disc disorder M51.9    Spinal stenosis of lumbar region without neurogenic claudication  M48.061    Closed compression fracture of thoracic vertebra (HCC) S22.000A    Thoracic radiculopathy M54.14    Cat bite of left lower leg S81.852A, W55.01XA    EKG abnormalities R94.31    Influenza A J10.1    Elevated troponin R79.89    IMTIAZ (acute kidney injury) N17.9       Isolation/Infection:   Isolation            Droplet          Patient Infection Status       Infection Onset Added Last Indicated Last Indicated By Review Planned Expiration Resolved Resolved By    Influenza 02/28/25 02/28/25 02/28/25 COVID-19 & Influenza Combo 03/07/25 03/10/25                         Nurse Assessment:  Last Vital Signs: BP (!) 126/55   Pulse 55   Temp 98.4 °F (36.9 °C) (Axillary)   Resp 18   SpO2 98%     Last documented pain score (0-10 scale):    Last Weight:   Wt Readings from Last 1 Encounters:   06/09/24 83.9 kg (185 lb)     Mental Status:  disoriented and alert    IV Access:  - None    Nursing Mobility/ADLs:  Walking   Assisted  Transfer  Assisted  Bathing  Assisted  Dressing  Assisted  Toileting  Assisted  Feeding  Assisted  Med Admin  Dependent  Med Delivery   crushed and prefers mixed with pudding    Wound Care Documentation and Therapy:        Elimination:  Continence:   Bowel: Yes  Bladder: Yes  Urinary Catheter: Insertion Date: 3/4/2025    Colostomy/Ileostomy/Ileal Conduit: No       Date of Last BM: 3/5/2025    Intake/Output Summary (Last 24 hours) at 3/4/2025 1513  Last data filed at 3/4/2025 0601  Gross per 24 hour   Intake 320 ml   Output 300 ml   Net 20 ml     I/O last 3 completed shifts:  In: 320 [P.O.:320]  Out: 300 [Urine:300]    Safety Concerns:     At Risk for Falls    Impairments/Disabilities:      None    Nutrition Therapy:  Current Nutrition Therapy:   - Oral Diet:  General    Routes of Feeding: Oral  Liquids: Thin Liquids  Daily Fluid Restriction: no  Last Modified Barium Swallow with Video (Video Swallowing Test): not done    Treatments at the Time of Hospital Discharge:   Respiratory Treatments:    Oxygen Therapy:      Ventilator:    - No ventilator support    Rehab Therapies: Physical Therapy and Occupational Therapy  Weight Bearing Status/Restrictions: No weight bearing restrictions  Other Medical Equipment (for information only, NOT a DME order):  walker  Other Treatments:     Patient's personal belongings (please select all that are sent with patient):  Glasses, Dentures upper and lower    RN SIGNATURE:  Electronically signed by Sheila Mackey RN on 3/5/25 at 11:37 AM EST    CASE MANAGEMENT/SOCIAL WORK SECTION    Inpatient Status Date: 3/1/25    Readmission Risk Assessment Score:  [facility] RISK OF UNPLANNED READMISSION 2.0             14.5 Total Score        Discharging to Facility/ Agency   Name: [facility], [facility] [phone], Fax [phone],  Address:  Phone:  Fax:    Dialysis Facility (if applicable)   Name:  Address:  Dialysis Schedule:  Phone:  Fax:    / signature: Electronically signed by JOSIAS Elizabeth on 3/4/25 at 3:13 PM EST    PHYSICIAN SECTION    Prognosis: Good    Condition at Discharge: Stable    Rehab Potential (if transferring to Rehab): {Prognosis:[id number]}    Recommended Labs or Other Treatments After Discharge: ***    Physician Certification: I certify the above information and transfer of Ivon Lowry  is necessary for the continuing treatment of the diagnosis listed and that she requires Skilled Nursing Facility for less 30 days.     Update Admission H&P: {CHP DME Changes in HandP:[id number]}    PHYSICIAN SIGNATURE:  Electronically signed by Joe Jones DO on 3/5/25 at 11:11 AM EST

## 2025-03-04 NOTE — PROGRESS NOTES
Cardiology  Progress Note      SUBJECTIVE:  No chest pain. No dyspnea.  No acute distress.     Current Inpatient Medications  Current Facility-Administered Medications: ipratropium 0.5 mg-albuterol 2.5 mg (DUONEB) nebulizer solution 1 Dose, 1 Dose, Inhalation, Q4H PRN  hydrocortisone (ANUSOL-HC) 2.5 % rectal cream, , Rectal, BID  aspirin EC tablet 81 mg, 81 mg, Oral, Daily  metoprolol tartrate (LOPRESSOR) tablet 12.5 mg, 12.5 mg, Oral, BID  oseltamivir (TAMIFLU) capsule 75 mg, 75 mg, Oral, BID  divalproex (DEPAKOTE SPRINKLE) DR capsule 250 mg, 250 mg, Oral, TID AC  fluticasone (FLONASE) 50 MCG/ACT nasal spray 1 spray, 1 spray, Each Nostril, QHS  loperamide (IMODIUM) capsule 2 mg, 2 mg, Oral, 4x Daily PRN  therapeutic multivitamin-minerals 1 tablet, 1 tablet, Oral, Daily  polyethylene glycol (GLYCOLAX) packet 17 g, 17 g, Oral, Daily  spironolactone (ALDACTONE) tablet 50 mg, 50 mg, Oral, Daily  spironolactone (ALDACTONE) tablet 25 mg, 25 mg, Oral, QHS  Vitamin D (CHOLECALCIFEROL) tablet 2,000 Units, 2,000 Units, Oral, Daily      Physical  VITALS:  BP (!) 146/71   Pulse 53   Temp 97.7 °F (36.5 °C) (Axillary)   Resp 18   SpO2 97%   CURRENT TEMPERATURE:  Temp: 97.7 °F (36.5 °C)  CONSTITUTIONAL: No acute distress.  EYES: Vision is intact.  ENT: No sore throat.  No ear drainage.  NECK: No JVD.  BACK: Symmetric.  LUNGS:  diminished breath sounds right base and left base  CARDIOVASCULAR:  normal S1 and S2, no S3, and no S4  ABDOMEN:  non-tender  NEUROLOGIC: No focal deficits.    EXTREMITIES: +1 edema both legs.    DATA:        Cardiology Labs:    Lab Results   Component Value Date/Time     03/03/2025 06:39 AM    K 4.1 03/03/2025 06:39 AM    CL 99 03/03/2025 06:39 AM    CO2 28 03/03/2025 06:39 AM    BUN 37 03/03/2025 06:39 AM    CREATININE 0.9 03/03/2025 06:39 AM    GLUCOSE 89 03/03/2025 06:39 AM    GLUCOSE 94 04/20/2012 10:20 AM    CALCIUM 9.6 03/03/2025 06:39 AM    LABGLOM 62 03/03/2025 06:39 AM      CBC:     Recent Labs     02/28/25 2032   WBC 8.5   HGB 12.9   HCT 39.2   MCV 93.8     TROPONIN:    Lab Results   Component Value Date    CKTOTAL 81 02/28/2025    TROPHS 144 (H) 03/04/2025       ASSESSMENT & PLAN:        Altered mental status  Shortness of breath with cough  Elevated troponin level  Coronary artery disease  Possible sepsis/UTI  Confusion    Patient was brought to the emergency room because of some cough and chest congestion    She was started on antibiotics    From the cardiac standpoint she had elevated troponin level around 525 on admission.  Repeat level came down to 421 consistent with non-STEMI    She has problem with coronary artery disease as I stated above    Patient is confused  and with her mental status I do not feel she is suitable for any cardiac workup at this point              Electronically signed by Rosy Carcamo MD on 3/4/2025 at 8:41 AM

## 2025-03-05 VITALS
DIASTOLIC BLOOD PRESSURE: 62 MMHG | TEMPERATURE: 97.3 F | OXYGEN SATURATION: 96 % | WEIGHT: 161.8 LBS | HEART RATE: 64 BPM | RESPIRATION RATE: 19 BRPM | BODY MASS INDEX: 31.77 KG/M2 | SYSTOLIC BLOOD PRESSURE: 114 MMHG | HEIGHT: 60 IN

## 2025-03-05 LAB
MICROORGANISM SPEC CULT: NO GROWTH
SERVICE CMNT-IMP: NORMAL
SPECIMEN DESCRIPTION: NORMAL

## 2025-03-05 PROCEDURE — 97165 OT EVAL LOW COMPLEX 30 MIN: CPT

## 2025-03-05 PROCEDURE — 6370000000 HC RX 637 (ALT 250 FOR IP): Performed by: FAMILY MEDICINE

## 2025-03-05 PROCEDURE — 97535 SELF CARE MNGMENT TRAINING: CPT

## 2025-03-05 PROCEDURE — 6370000000 HC RX 637 (ALT 250 FOR IP): Performed by: SPECIALIST

## 2025-03-05 PROCEDURE — 97530 THERAPEUTIC ACTIVITIES: CPT

## 2025-03-05 RX ORDER — OSELTAMIVIR PHOSPHATE 30 MG/1
30 CAPSULE ORAL 2 TIMES DAILY
Status: DISCONTINUED | OUTPATIENT
Start: 2025-03-05 | End: 2025-03-05 | Stop reason: HOSPADM

## 2025-03-05 RX ADMIN — HYDROCORTISONE: 25 CREAM TOPICAL at 08:47

## 2025-03-05 RX ADMIN — DIVALPROEX SODIUM 250 MG: 125 CAPSULE, COATED PELLETS ORAL at 11:21

## 2025-03-05 RX ADMIN — ASPIRIN 81 MG: 81 TABLET, COATED ORAL at 08:47

## 2025-03-05 RX ADMIN — POLYETHYLENE GLYCOL 3350 17 G: 17 POWDER, FOR SOLUTION ORAL at 08:47

## 2025-03-05 RX ADMIN — DIVALPROEX SODIUM 250 MG: 125 CAPSULE, COATED PELLETS ORAL at 05:30

## 2025-03-05 RX ADMIN — OSELTAMIVIR PHOSPHATE 75 MG: 75 CAPSULE ORAL at 08:47

## 2025-03-05 RX ADMIN — SPIRONOLACTONE 50 MG: 25 TABLET ORAL at 05:30

## 2025-03-05 RX ADMIN — Medication 2000 UNITS: at 08:47

## 2025-03-05 ASSESSMENT — ENCOUNTER SYMPTOMS
NAUSEA: 0
VOMITING: 0

## 2025-03-05 NOTE — PROGRESS NOTES
Spiritual Health History and Assessment/Progress Note  [facility]     Encounter, Rituals, Rites and Sacraments,  ,  ,      Name: Ivon Lowry MRN: [id number]    Age: 89 y.o.     Sex: female   Language: English   Church: Religion   EKG abnormalities     Date: 3/5/2025                           Spiritual Assessment began in [facility] IMCU        Referral/Consult From: Rounding   Encounter Overview/Reason:  Encounter, Rituals, Rites and Sacraments  Service Provided For: Patient    Jailene, Belief, Meaning:   Patient is connected with a jailene tradition or spiritual practice  Family/Friends No family/friends present      Importance and Influence:  Patient unable to assess at this time  Family/Friends No family/friends present    Community:  Patient Other: Patient was disoriented  Family/Friends No family/friends present    Assessment and Plan of Care:     Patient Interventions include: Provided sacramental/Yazdanism ritual  Family/Friends Interventions include: No family/friends present    Patient Plan of Care: Spiritual Care available upon further referral  Family/Friends Plan of Care: Spiritual Care available upon further referral    Electronically signed by Chaplain Rolando on 3/5/2025 at 4:29 PM

## 2025-03-05 NOTE — PROGRESS NOTES
Cardiology  Progress Note      SUBJECTIVE: Patient was sitting up in chair when I came to see her.  She looked weak but no acute distress.  Denied chest pain    Current Inpatient Medications  Current Facility-Administered Medications: oseltamivir (TAMIFLU) capsule 30 mg, 30 mg, Oral, BID  ipratropium 0.5 mg-albuterol 2.5 mg (DUONEB) nebulizer solution 1 Dose, 1 Dose, Inhalation, Q4H PRN  hydrocortisone (ANUSOL-HC) 2.5 % rectal cream, , Rectal, BID  aspirin EC tablet 81 mg, 81 mg, Oral, Daily  metoprolol tartrate (LOPRESSOR) tablet 12.5 mg, 12.5 mg, Oral, BID  divalproex (DEPAKOTE SPRINKLE) DR capsule 250 mg, 250 mg, Oral, TID AC  fluticasone (FLONASE) 50 MCG/ACT nasal spray 1 spray, 1 spray, Each Nostril, QHS  loperamide (IMODIUM) capsule 2 mg, 2 mg, Oral, 4x Daily PRN  therapeutic multivitamin-minerals 1 tablet, 1 tablet, Oral, Daily  polyethylene glycol (GLYCOLAX) packet 17 g, 17 g, Oral, Daily  spironolactone (ALDACTONE) tablet 50 mg, 50 mg, Oral, Daily  spironolactone (ALDACTONE) tablet 25 mg, 25 mg, Oral, QHS  Vitamin D (CHOLECALCIFEROL) tablet 2,000 Units, 2,000 Units, Oral, Daily      Physical  VITALS:  /62   Pulse 64   Temp 97.3 °F (36.3 °C) (Temporal)   Resp 19   Ht 1.52 m (4' 11.84\")   Wt 73.4 kg (161 lb 12.8 oz)   SpO2 96%   BMI 31.77 kg/m²   CURRENT TEMPERATURE:  Temp: 97.3 °F (36.3 °C)  CONSTITUTIONAL: No acute distress.  EYES: Vision is intact.  ENT: No sore throat.  No ear drainage.  NECK: No JVD.  BACK: Symmetric.  LUNGS:  diminished breath sounds right base and left base  CARDIOVASCULAR:  normal S1 and S2, no S3, and no S4  ABDOMEN:  non-tender  NEUROLOGIC: No focal deficits.    EXTREMITIES: No edema cyanosis or clubbing.    DATA:      ECG:  I have reviewed EKG with the following interpretation:  normal sinus rhythm      Cardiology Labs:    Lab Results   Component Value Date/Time     03/03/2025 06:39 AM    K 4.1 03/03/2025 06:39 AM    CL 99 03/03/2025 06:39 AM    CO2 28 03/03/2025  06:39 AM    BUN 37 03/03/2025 06:39 AM    CREATININE 0.9 03/03/2025 06:39 AM    GLUCOSE 89 03/03/2025 06:39 AM    GLUCOSE 94 04/20/2012 10:20 AM    CALCIUM 9.6 03/03/2025 06:39 AM    LABGLOM 62 03/03/2025 06:39 AM      CBC:    Recent Labs     02/28/25 2032   WBC 8.5   HGB 12.9   HCT 39.2   MCV 93.8     TROPONIN:    Lab Results   Component Value Date    CKTOTAL 81 02/28/2025    TROPHS 144 (H) 03/04/2025       ASSESSMENT & PLAN:        Altered mental status  Shortness of breath with cough  Elevated troponin level  Coronary artery disease  Possible sepsis/UTI  Confusion    Patient was brought to the emergency room because of some cough and chest congestion    She was started on antibiotics    From the cardiac standpoint she had elevated troponin level around 525 on admission.  Repeat level came down to 421 consistent with non-STEMI    She has problem with coronary artery disease as I stated above    Patient is confused  and with her mental status I do not feel she is suitable for any cardiac workup at this point                Electronically signed by Rosy Carcamo MD on 3/5/2025 at 2:46 PM

## 2025-03-05 NOTE — PROGRESS NOTES
Physical Therapy  Physical Therapy Treatment Note/Plan of Care    Room #:  0630/0630-01  Patient Name: Ivon Lowry  YOB: 1935  MRN: [id number]    Date of Service: 3/5/2025     Tentative placement recommendation: Subacute Rehab  Equipment recommendation: To be determined      Evaluating Physical Therapist: Omer Solorzano, PT, DPT #[id number]    2  Specific Provider Orders/Date/Referring Provider :     03/03/25 0845    PT eval and treat  Start:  03/03/25 0845,   End:  03/03/25 0845,   ONE TIME,   Standing Count:  1 Occurrences,   R       Robert, Joe F, DO Acknowledge New      Admitting Diagnosis:   Influenza [J11.1]  EKG abnormalities [R94.31]  Elevated troponin [R79.89]      Surgery: none  Visit Diagnoses         Codes    Influenza    -  Primary J11.1            Patient Active Problem List   Diagnosis    Hypokalemia    Acute respiratory failure with hypoxemia (HCC)    Aldosterone-secreting adenoma    CAD (coronary artery disease)    RSV (respiratory syncytial virus infection)    Pneumonitis    Chronic pain syndrome    Lumbar facet arthropathy    Lumbar radiculopathy    Lumbar disc disorder    Spinal stenosis of lumbar region without neurogenic claudication    Closed compression fracture of thoracic vertebra (HCC)    Thoracic radiculopathy    Cat bite of left lower leg    EKG abnormalities    Influenza A    Elevated troponin    ITMIAZ (acute kidney injury)        ASSESSMENT of Current Deficits Patient exhibits decreased strength, balance, and endurance impairing functional mobility, transfers, gait , gait distance, and tolerance to activity. Pt very confused during session and oriented to self only. Patient needing tactile cues as she doesn't always follow verbal cues. Patient needing min assist to stand from chair, however moderate assist HHA for standing balance due to unsteadiness. Patient performed seated exercises in chair with AAROM. Patient would benefit from further therapy to address above  deficits and inc mobility in order to decrease risk of falls.       PHYSICAL THERAPY  PLAN OF CARE       Physical therapy plan of care is established based on physician order,  patient diagnosis and clinical assessment    Current Treatment Recommendations:    -Bed Mobility: Lower and upper extremity exercises, and trunk control activities  -Sitting Balance: Incorporate reaching activities to activate trunk muscles , Hands on support to maintain midline , Facilitate active trunk muscle engagement , Facilitate postural control in all planes , and Engage in core activities to allow for movement within base of support   -Standing Balance: Perform strengthening exercises in standing to promote motor control with or without upper extremity support , Instruct patient on adequate base of support to maintain balance, and Challenge balance utilizing reaching  activities beyond center of gravity    -Transfers: Provide instruction on proper hand and foot position for adequate transfer of weight onto lower extremities and use of gait device if needed, Cues for hand placement, technique and safety. Provide stabilization to prevent fall , Facilitate weight shift forward on to lower extremities and provide necessary stabilization of bilateral lower extremities , Support transfer of weight on to lower extremities, and Assist with extension of knees trunk and hip to accept weight transfer   -Gait: Gait training, Standing activities to improve: base of support, weight shift, weight bearing , Exercises to improve trunk control, Exercises to improve hip and knee control, Performance of protected weight bearing activities, and Activities to increase weight bearing   -Endurance: Utilize Supervised activities to increase level of endurance to allow for safe functional mobility including transfers and gait  and Use graduated activities to promote good breathing techniques and provide support and education to maximize respiratory  days   Pain level   0/10   None reported    Bed Mobility  Using rails and head of bed elevated:     Rolling: Supervision     Supine to sit: Supervision     Sit to supine: Supervision     Scooting: Supervision    Using rails and head of bed elevated:     Rolling: Not assessed patient in chair   Supine to sit: Not assessed patient in chair   Sit to supine: Not assessed patient in chair   Scooting: Not assessed patient in chair    Rolling: Independent    Supine to sit: Independent    Sit to supine: Independent    Scooting: Independent     Transfers Sit to stand: Minimal assist of 1  Sit to stand: Minimal assist of 1 cues for hand placement and safety   Sit to stand: Supervision     Ambulation     5 feet using  hand held assist with Moderate progressing to minimal assist   for balance, upright, weight shift, and safety not assessed      > 15 feet using  least restrictive device with Not assessed     ROM Within functional limits    Increase range of motion 10% of affected joints    Strength BUE:  refer to OT eval  RLE:  3+/5  LLE:   3+/5  Increase strength in affected mm groups by 1/3 grade   Balance Sitting EOB:  fair +  Dynamic Standing:  fair with HHA Sitting EOB: not assessed   Dynamic Standing: poor + HHA   Sitting EOB:  good-  Dynamic Standing: fair+ with LRD      Patient is Alert & Oriented x person and follows one step directions very confused and needing tactile cues  Sensation:  Patient  denies numbness/tingling   Edema:  no   Endurance: fair -    Vitals:  2 liters nasal cannula   Blood Pressure at rest  Blood Pressure during session    Heart Rate at rest  Heart Rate during session    SPO2 at rest %  SPO2 during session %     Patient education  Patient educated on role of Physical Therapy, risks of immobility, safety and plan of care, energy conservation,  importance of mobility while in hospital , safety , and seated exercises      Patient response to education:   Pt verbalized understanding Pt demonstrated  skill Pt requires further education in this area   Yes Partial Yes      Treatment:  Patient practiced and was instructed/facilitated in the following treatment: Patient assisted with seated exercises in chair. Patient assisted to standing, performed standing marches and needed to sit due to fatigue.          Therapeutic Exercises:  ankle pumps, long arc quad, and seated marching x10-15 reps, AAROM      At end of session, patient in chair with alarm call light and phone within reach,  all lines and tubes intact, nursing notified.      Patient would benefit from continued skilled Physical Therapy to improve functional independence and quality of life.         Patient's/ family goals   none stated    Time in    1150  Time out  1201    Total Treatment Time  11 minutes    CPT codes:  Therapeutic activities (61446)   11 minutes  1 unit(s)    Maryan Benites PTA   #[id number]

## 2025-03-05 NOTE — PROGRESS NOTES
OCCUPATIONAL THERAPY INITIAL EVALUATION    [facility]  [address] [place] [place]        Date:3/5/2025                                                  Patient Name: Ivon Lowry    MRN: [id number]    : 1935    Room: [address] [place] [address]      Evaluating OT: Lokesh Mahmood OTR/L; #[id number]     Referring Provider and Specific Provider Orders/Date:      25 1600  OT eval and treat  Start:  25 1600,   End:  25 1600,   ONE TIME,   Standing Count:  1 Occurrences,   R         Verbhaskar, Joe F, DO      Placement Recommendation: Subacute Rehab       Diagnosis:   1. Influenza    2. Elevated troponin    3. EKG abnormalities         Surgery: None      Pertinent Medical History:       Past Medical History:   Diagnosis Date    Aldosterone-secreting adenoma     f/w Dr Marin - Endocrinology    CAD (coronary artery disease)     f/w Dr Carcamo    Diverticulosis     GERD (gastroesophageal reflux disease)     Osteoarthritis     RSV (respiratory syncytial virus infection) 2019    Sleep apnea          Past Surgical History:   Procedure Laterality Date    COLONOSCOPY      EYE SURGERY      HERNIA REPAIR      HYSTERECTOMY (CERVIX STATUS UNKNOWN)      KIDNEY SURGERY      UPPER GASTROINTESTINAL ENDOSCOPY          Precautions:  Up with assistance, falls, alarm, O2, Hx of Dementia      Assessment of current deficits:     [x] Functional mobility  [x]ADLs  [x] Strength               [x]Cognition    [x] Functional transfers   [x] IADLs         [] Safety Awareness   [x]Endurance    [] Fine Coordination              [x] Balance      [] Vision/perception   []Sensation     []Gross Motor Coordination  [] ROM  [] Delirium                   [] Motor Control     OT PLAN OF CARE   OT POC based on physician orders, patient diagnosis and results of clinical assessment    Frequency/Duration 1-3 days/wk for 2 weeks PRN     Specific OT Treatment Interventions to include:   *  session, patient was supine with HOB elevated with call light and phone within reach, all lines and tubes intact.  Overall patient demonstrated decreased independence and safety during completion of ADL/functional transfer/mobility tasks.  Pt would benefit from continued skilled OT to increase safety and independence with completion of ADL/IADL tasks for functional independence and quality of life.    Treatment: OT treatment provided this date includes:   Instruction/training on safety and adapted techniques for completion of ADLs   Instruction/training on safe functional mobility/transfer techniques   Instruction/training on energy conservation/work simplification for completion of ADLs   Instruction/training on proper positioning/alignment to prevent contractures    Neuromuscular Reeducation to facilitate balance/righting reactions for increased function with ADLs tasks      Treatment:      Functional transfers: Facilitated transfers to/from toilet, EOB and  chair with cues for body alignment, safety and hand placement.  ADL completion: Self-care retraining for the above-mentioned ADLs; training on proper hand placement, safety technique, sequencing, and energy conservation techniques.  Postural Balance: Sitting/standing balance retraining to improve righting reactions with postural changes during ADLs.      Rehab Potential: Good for established goals.      Patient / Family Goal: None provided      Patient and/or family were instructed on functional diagnosis, prognosis/goals and OT plan of care. Demonstrated good understanding.     Eval Complexity: Low  History: Brief review of medical records and additional review of physical, cognitive, or psychosocial history related to current functional performance  Exam: 3+ performance deficits  Assistance/Modification: Mod assistance or modifications required to perform tasks. May have comorbidities that affect occupational performance.    Time In: 10:42 AM   Time Out:  11:20 AM    Total Treatment Time: 23      Min Units   OT Eval Low 97165  X  1    OT Eval Medium 44601      OT Eval High 76008      OT Re-Eval 51597            ADL/Self Care 07627 23 2   Therapeutic Activities 16038       Therapeutic Ex 09440       Orthotic Management 09624       Manual 72798     Neuro Re-Ed 93490       Non-Billable Time        Evaluation Time additionally includes thorough review of current medical information, gathering information on past medical history/social history and prior level of function, interpretation of standardized testing/informal observation of tasks, assessment of data and development of plan of care and goals.        Evaluating OT: Lokesh Mahmood OTR/L; #[id number]

## 2025-03-05 NOTE — CONSULTS
[address]  [place], [place] [address]  [phone]    INPATIENT CONSULTATION RECORD FOR  3/4/2025      REASON FOR CONSULTATION: Acute situational urinary retention/Bilateral renal cysts      HISTORY OF PRESENT ILLNESS:      The patient is a 89 y.o. female patient who was admitted a few days with a productive cough and shortness of breath.  She is pleasantly confused at this time and unable to provide detailed medical history.  There is no prior urologic history and review of her chart.  A May was placed for an unspecified residual amount.  She is being treated by Dr. Oh for pneumonia.  Urine culture on 3/2/2025 was negative.  She is comfortable with the catheter and denies any pain.  CT scan on 11/6/2018 shows no hydronephrosis or stones.  There were multiple bilateral renal cysts which are benign.      Past Medical History:   Diagnosis Date    Aldosterone-secreting adenoma     f/w Dr Marin - Endocrinology    CAD (coronary artery disease)     f/w Dr Carcamo    Diverticulosis     GERD (gastroesophageal reflux disease)     Osteoarthritis     RSV (respiratory syncytial virus infection) 01/2019    Sleep apnea          Past Surgical History:   Procedure Laterality Date    COLONOSCOPY      EYE SURGERY      HERNIA REPAIR      HYSTERECTOMY (CERVIX STATUS UNKNOWN)      KIDNEY SURGERY      UPPER GASTROINTESTINAL ENDOSCOPY         Medications Prior to Admission:    Medications Prior to Admission: hydrocortisone (ANUSOL-HC) 2.5 % CREA rectal cream, Place rectally 2 times daily  divalproex (DEPAKOTE SPRINKLE) 125 MG DR capsule, Take 2 capsules by mouth 3 times daily (before meals)  fluticasone (FLONASE) 50 MCG/ACT nasal spray, 1 spray by Each Nostril route nightly  loperamide (IMODIUM) 2 MG capsule, Take 1 capsule by mouth 4 times daily as needed for Diarrhea  polyethylene glycol (GLYCOLAX) 17 g packet, Take 1 packet by mouth daily  spironolactone (ALDACTONE) 50 MG tablet, Take 1 tablet by mouth daily  Vitamin  02/28/2025    MCV 93.8 02/28/2025     09/30/2022       Lab Results   Component Value Date    BUN 37 (H) 03/03/2025    CREATININE 0.9 03/03/2025         ASSESSMENT / PLAN:    Acute situational urinary retention/Bilateral renal cysts  She is comfortable with the catheter.  Recent urine culture is negative.  She will continue antibiotics per infectious disease recommendations.  Her serum creatinine is normal at 0.9.  Avoid anticholinergic medication especially in this age population.  She will be given a voiding trial once she is medically alert and stable and possibly as an outpatient.  Continue the May for now and irrigate as needed to maintain patency.  We will continue to follow her during her hospital stay.  Thank you for allowing me to assist in her care.  Sincerely,      Eh Lux MD  7:35 PM  3/4/2025

## 2025-03-05 NOTE — PROGRESS NOTES
Progress Note  Date:3/5/2025       Room:30/0630-01  Patient Name:Iovn Lowry     YOB: 1935     Age:89 y.o.        Subjective    Subjective:  Symptoms:  Stable.  She reports malaise and weakness.    Diet:  Adequate intake.  No nausea or vomiting.    Activity level: Impaired due to weakness.    Pain:  She reports no pain.       Review of Systems   Constitutional:  Negative for fever.   Gastrointestinal:  Negative for nausea and vomiting.   Neurological:  Positive for weakness.   All other systems reviewed and are negative.    Objective         Vitals Last 24 Hours:  TEMPERATURE:  Temp  Av.2 °F (36.8 °C)  Min: 97.8 °F (36.6 °C)  Max: 98.6 °F (37 °C)  RESPIRATIONS RANGE: Resp  Av.2  Min: 16  Max: 18  PULSE OXIMETRY RANGE: SpO2  Av.4 %  Min: 94 %  Max: 100 %  PULSE RANGE: Pulse  Av.2  Min: 55  Max: 58  BLOOD PRESSURE RANGE: Systolic (24hrs), Av , Min:131 , Max:146   ; Diastolic (24hrs), Av, Min:51, Max:87    I/O (24Hr):    Intake/Output Summary (Last 24 hours) at 3/5/2025 0855  Last data filed at 3/5/2025 0823  Gross per 24 hour   Intake 600 ml   Output 1750 ml   Net -1150 ml     Objective:  General Appearance:  Ill-appearing.    Vital signs: (most recent): Blood pressure (!) 146/67, pulse 55, temperature 98.6 °F (37 °C), temperature source Oral, resp. rate 18, SpO2 98%, not currently breastfeeding.  Vital signs are normal.  No fever.    Output: Producing urine and producing stool.    HEENT: Normal HEENT exam.    Lungs:  Normal effort and normal respiratory rate.  Breath sounds clear to auscultation.    Heart: Normal rate.  Regular rhythm.    Abdomen: Abdomen is soft.  Bowel sounds are normal.   There is no abdominal tenderness.     Extremities: Decreased range of motion.    Neurological: Patient is alert.    Skin:  Warm and dry.      Labs/Imaging/Diagnostics    Labs:  CBC:No results for input(s): \"WBC\", \"RBC\", \"HGB\", \"HCT\", \"MCV\", \"RDW\", \"PLT\" in the last 72  hours.  CHEMISTRIES:  Recent Labs     03/03/25  0639      K 4.1   CL 99   CO2 28   BUN 37*   CREATININE 0.9   GLUCOSE 89     PT/INR:No results for input(s): \"PROTIME\", \"INR\" in the last 72 hours.  APTT:No results for input(s): \"APTT\" in the last 72 hours.  LIVER PROFILE:No results for input(s): \"AST\", \"ALT\", \"BILIDIR\", \"BILITOT\", \"ALKPHOS\" in the last 72 hours.    Imaging Last 24 Hours:  No results found.  Assessment//Plan           Hospital Problems             Last Modified POA    * (Principal) EKG abnormalities 3/1/2025 Yes    Influenza A 3/1/2025 Yes    Elevated troponin 3/1/2025 Yes    IMTIAZ (acute kidney injury) 3/1/2025 Yes     Assessment:    Condition: In stable condition.  Improving.   (Doing fine with ash, urine culture negative, still weak).     Plan:   Per physical therapy and encourage ambulation.  Wean off oxygen.  Consults: medicine, urology, physical therapy and .   (Continue current management  PT/OT  Discharge planning).       Electronically signed by Joe Jones DO on 3/5/25 at 8:55 AM EST

## 2025-03-05 NOTE — PROGRESS NOTES
3/5/2025 10:23 AM  Ivon Lowry  [id number]    Subjective:    Telesitter at bedside  Laying in bed  Pleasant  Ash with blood tinged urine     Review of Systems  Constitutional: No fever or chills   Respiratory: negative for cough and hemoptysis  Cardiovascular: negative for chest pain and dyspnea  Gastrointestinal: negative for abdominal pain, diarrhea, nausea and vomiting   : See above  Derm: negative for rash and skin lesion(s)  Neurological: negative for seizures and tremors  Musculoskeletal: Negative    Psychiatric: Negative   All other reviews are negative      Scheduled Meds:   hydrocortisone   Rectal BID    aspirin  81 mg Oral Daily    metoprolol tartrate  12.5 mg Oral BID    oseltamivir  75 mg Oral BID    divalproex  250 mg Oral TID AC    fluticasone  1 spray Each Nostril QHS    therapeutic multivitamin-minerals  1 tablet Oral Daily    polyethylene glycol  17 g Oral Daily    spironolactone  50 mg Oral Daily    spironolactone  25 mg Oral QHS    Vitamin D  2,000 Units Oral Daily       Objective:  Vitals:    03/05/25 0845   BP: (!) 146/67   Pulse: 55   Resp: 18   Temp: 98.6 °F (37 °C)   SpO2: 98%         Allergies: Iodine, Sulfa antibiotics, and Macrodantin [nitrofurantoin macrocrystal]    General Appearance: awake and alert, no distress, confused  Skin: no rash or erythema  Head: normocephalic and atraumatic  Pulmonary/Chest: normal air movement, no respiratory distress  Abdomen: soft, non-tender, non-distended  Genitourinary: ash with blood tinged urine   Extremities: no cyanosis, clubbing or edema         Labs:     Recent Labs     03/03/25  0639      K 4.1   CL 99   CO2 28   BUN 37*   CREATININE 0.9   GLUCOSE 89   CALCIUM 9.6       Lab Results   Component Value Date/Time    HGB 12.9 02/28/2025 08:32 PM    HCT 39.2 02/28/2025 08:32 PM       No results found for: \"PSA\"      Assessment/Plan:  Urinary retention (700ml)  Bilateral renal cysts    Irrigate the ash   Antibiotics per ID    Continue the ash   Voiding trail once she is ambulatory. Likely will need to be done as an outpatient once. Pending possible rehab?   Hold on additional  Interventions   Orders placed to notify urology of DC plans and when ambulatory to talk about voiding trial         RUTHANN Vidal - CNP   MICKEY  Urology    Agree with above  Seen and examined  Agree with the plan and treatment    Edmund Dony, DO

## 2025-03-05 NOTE — CARE COORDINATION
SOCIAL WORK / DISCHARGE PLANNING:  [facility] [phone], Fax [phone], skilled prior to a return to [facility]  NO PRECERT needed. TYRESE will need signed by physician. HENs form initiated, will need completed prior to dc.     Addendum: 1257pm. Transport arrranged via [employer] 3pm. Ciarra SAUER charge aware. Sw notified Eh cesar via phone of dc and time of transport. HENS completed.       Electronically signed by JOSIAS Elizabeth on 3/5/2025 at 10:44 AM

## 2025-03-05 NOTE — PLAN OF CARE
Problem: Discharge Planning  Goal: Discharge to home or other facility with appropriate resources  3/5/2025 0930 by Sheila Mackey RN  Outcome: Progressing  3/4/2025 2256 by Cristin Martin RN  Outcome: Progressing     Problem: Skin/Tissue Integrity  Goal: Skin integrity remains intact  Description: 1.  Monitor for areas of redness and/or skin breakdown  2.  Assess vascular access sites hourly  3.  Every 4-6 hours minimum:  Change oxygen saturation probe site  4.  Every 4-6 hours:  If on nasal continuous positive airway pressure, respiratory therapy assess nares and determine need for appliance change or resting period  3/5/2025 0930 by Sheila Mackey RN  Outcome: Progressing  3/4/2025 2256 by Cristin Martin RN  Outcome: Progressing     Problem: Respiratory - Adult  Goal: Achieves optimal ventilation and oxygenation  3/5/2025 0930 by Sheila Mackey RN  Outcome: Progressing  3/4/2025 2256 by Cristin Martin RN  Outcome: Progressing     Problem: Safety - Adult  Goal: Free from fall injury  3/5/2025 0930 by Sheila Mackey RN  Outcome: Progressing  3/4/2025 2256 by Cristin Martin RN  Outcome: Progressing     Problem: Pain  Goal: Verbalizes/displays adequate comfort level or baseline comfort level  3/5/2025 0930 by Sheila Mackey RN  Outcome: Progressing  3/4/2025 2256 by Crisitn Martin RN  Outcome: Progressing

## 2025-03-05 NOTE — PROGRESS NOTES
Renal Dose Adjustment Policy (Generic)     This patient is on medication that requires renal, weight, and/or indication dose adjustment.      Date Body Weight IBW  Adjusted BW SCr  CrCl Dialysis status   3/5/2025 73.4 kg (161 lb 12.8 oz) Ideal body weight: 50.2 kg (110 lb 9.9 oz)  Adjusted ideal body weight: 59.5 kg (131 lb 1.4 oz) Serum creatinine: 0.9 mg/dL 03/03/25 0639  Estimated creatinine clearance: 40 mL/min N/a       Pharmacy has dose-adjusted the following medication(s):    Date Previous Order Adjusted Order   3/5/2025 Oseltamivir 75mg bid Oseltamivir 30mg bid     These changes were made per protocol according to the [facility]   Automatic Renal Dose Adjustment Policy.     *Please note this dose may need readjusted if patient's condition changes.    Please contact pharmacy with any questions regarding these changes.    Levi Saenz [facility] 3/5/2025 10:55 AM

## 2025-03-24 NOTE — DISCHARGE SUMMARY
Discharge Summary    Date: 3/24/2025  Patient Name: Ivon Lowry    YOB: 1935     Age: 89 y.o.    Admit Date: 2/28/2025  Discharge Date: 3/5/2025  Discharge Condition: Good    Admission Diagnosis  Influenza [J11.1];EKG abnormalities [R94.31];Elevated troponin [R79.89]      Discharge Diagnosis  Principal Problem:    EKG abnormalities  Active Problems:    Influenza A    Elevated troponin    IMTIAZ (acute kidney injury)  Resolved Problems:    * No resolved hospital problems. *      Hospital Stay  Narrative of Hospital Course:  Admitted for hypoxic respiratory failure and influenza,  elevated troponin, improved over course of hospital stay.      Consultants:  IP CONSULT TO INTERNAL MEDICINE  IP CONSULT TO INFECTIOUS DISEASES  IP CONSULT TO CARDIOLOGY  IP CONSULT TO UROLOGY    Surgeries/procedures Performed:  none     Treatments:    Analgesia, Antibiotics, Cardiac Medications and IV Hydration        Discharge Plan/Disposition:  To [facility]/Incidental Findings Requiring Follow Up:    Patient Instructions:    Diet: Regular Diet    Activity:Activity as Tolerated  For number of days (if applicable):      Other Instructions:    Provider Follow-Up:   No follow-ups on file.     Significant Diagnostic Studies:    Recent Labs:  Admission on 02/28/2025, Discharged on 03/05/2025  WBC                                           Date: 02/28/2025  Value: 8.5         Ref range: 4.5 - 11.5 k/uL    Status: Final  RBC                                           Date: 02/28/2025  Value: 4.18        Ref range: 3.50 - 5.50 m/uL   Status: Final  Hemoglobin                                    Date: 02/28/2025  Value: 12.9        Ref range: 11.5 - 15.5 g/dL   Status: Final  Hematocrit                                    Date: 02/28/2025  Value: 39.2        Ref range: 34.0 - 48.0 %      Status: Final  MCV                                           Date: 02/28/2025  Value: 93.8        Ref range: 80.0 - 99.9 fL     not intended for use in patients <18 years of age.        eGFR results are calculated without a race factor using the 2021 CKD-EPI equation.  Careful clinical correlation is recommended, particularly when comparing to results   calculated using previous equations.  The CKD-EPI equation is less accurate in patients with extremes of muscle mass, extra-renal   metabolism of creatine, excessive creatine ingestion, or following therapy that affects   renal tubular secretion.    Calcium                                       Date: 03/01/2025  Value: 9.4         Ref range: 8.6 - 10.2 mg/dL   Status: Final  Ventricular Rate                              Date: 03/01/2025  Value: 89          Ref range: BPM                Status: Final  Atrial Rate                                   Date: 03/01/2025  Value: 89          Ref range: BPM                Status: Final  P-R Interval                                  Date: 03/01/2025  Value: 140         Ref range: ms                 Status: Final  QRS Duration                                  Date: 03/01/2025  Value: 94          Ref range: ms                 Status: Final  Q-T Interval                                  Date: 03/01/2025  Value: 398         Ref range: ms                 Status: Final  QTc Calculation (Bazett)                      Date: 03/01/2025  Value: 484         Ref range: ms                 Status: Final  P Axis                                        Date: 03/01/2025  Value: 50          Ref range: degrees            Status: Final  R Axis                                        Date: 03/01/2025  Value: -35         Ref range: degrees            Status: Final  T Axis                                        Date: 03/01/2025  Value: 164         Ref range: degrees            Status: Final  Sodium                                        Date: 03/02/2025  Value: 135         Ref range: 132 - 146 mmol/L   Status: Final  Potassium                                     Date:

## 2025-03-24 NOTE — PROGRESS NOTES
Physician Progress Note      PATIENT:               HERI DENG  CSN #:                  [id number]  :                       1935  ADMIT DATE:       2025 8:02 PM  DISCH DATE:        3/5/2025 2:50 PM  RESPONDING  PROVIDER #:        Joe Jones DO          QUERY TEXT:    Pt admitted with IMTIAZ and NSTEMI.  Noted documentation of \"possible SEPSIS/UTI\"   on consult note dated 25 by Dr. Carcamo. consultant.  If possible,   please document in progress notes and discharge summary:    The medical record reflects the following:  Risk Factors: UTI, + FLu A  Clinical Indicators: On admission: WBC: 8.5, temp: 97.5, HR: 69 on arrival.  Treatment: Tamiflu, 500 cc bolus  Options provided:  -- Sepsis present as evidenced by, Please document evidence.  -- Sepsis was ruled out after study  -- Other - I will add my own diagnosis  -- Disagree - Not applicable / Not valid  -- Disagree - Clinically unable to determine / Unknown  -- Refer to Clinical Documentation Reviewer    PROVIDER RESPONSE TEXT:    Sepsis was ruled out after study.    Query created by: Samantha Veliz on 3/7/2025 9:07 AM      Electronically signed by:  Joe Jones DO 3/24/2025 9:23 AM

## 2025-03-31 PROBLEM — J10.1 INFLUENZA A: Status: RESOLVED | Noted: 2025-03-01 | Resolved: 2025-03-31

## 2025-03-31 PROBLEM — R79.89 ELEVATED TROPONIN: Status: RESOLVED | Noted: 2025-03-01 | Resolved: 2025-03-31
